# Patient Record
Sex: FEMALE | Race: WHITE | NOT HISPANIC OR LATINO | Employment: OTHER | URBAN - METROPOLITAN AREA
[De-identification: names, ages, dates, MRNs, and addresses within clinical notes are randomized per-mention and may not be internally consistent; named-entity substitution may affect disease eponyms.]

---

## 2019-11-05 ENCOUNTER — HOSPITAL ENCOUNTER (INPATIENT)
Facility: HOSPITAL | Age: 44
LOS: 1 days | Discharge: HOME | End: 2019-11-06
Attending: INTERNAL MEDICINE | Admitting: INTERNAL MEDICINE

## 2019-11-05 ENCOUNTER — APPOINTMENT (OUTPATIENT)
Dept: CARDIOLOGY | Facility: HOSPITAL | Age: 44
End: 2019-11-05

## 2019-11-05 DIAGNOSIS — I21.11 STEMI INVOLVING RIGHT CORONARY ARTERY (HCC): Primary | ICD-10-CM

## 2019-11-05 PROBLEM — I10 ESSENTIAL HYPERTENSION: Status: ACTIVE | Noted: 2019-11-05

## 2019-11-05 PROBLEM — E78.5 HYPERLIPIDEMIA: Status: ACTIVE | Noted: 2019-11-05

## 2019-11-05 PROBLEM — Z91.148 NONCOMPLIANCE WITH MEDICATIONS: Status: ACTIVE | Noted: 2019-11-05

## 2019-11-05 PROBLEM — Z72.0 TOBACCO ABUSE: Status: ACTIVE | Noted: 2019-11-05

## 2019-11-05 PROBLEM — E11.65 POORLY CONTROLLED TYPE 2 DIABETES MELLITUS (HCC): Status: ACTIVE | Noted: 2019-11-05

## 2019-11-05 PROBLEM — Z91.14 NONCOMPLIANCE WITH MEDICATIONS: Status: ACTIVE | Noted: 2019-11-05

## 2019-11-05 LAB
ACT BLD: 169 SECONDS (ref 82–152)
ACT BLD: 285 SECONDS (ref 82–152)
ACT BLD: 368 SECONDS (ref 82–152)
ALBUMIN UR-MCNC: 2.1 MG/DL
ANION GAP SERPL CALCULATED.3IONS-SCNC: 12.9 MMOL/L (ref 5–15)
AORTIC DIMENSIONLESS INDEX: 0.7 (DI)
BH CV ECHO MEAS - ACS: 1.9 CM
BH CV ECHO MEAS - AO MAX PG: 10 MMHG
BH CV ECHO MEAS - AO MEAN PG (FULL): 2 MMHG
BH CV ECHO MEAS - AO MEAN PG: 5 MMHG
BH CV ECHO MEAS - AO ROOT AREA (BSA CORRECTED): 1.5
BH CV ECHO MEAS - AO ROOT AREA: 6.6 CM^2
BH CV ECHO MEAS - AO ROOT DIAM: 2.9 CM
BH CV ECHO MEAS - AO V2 MAX: 157 CM/SEC
BH CV ECHO MEAS - AO V2 MEAN: 94.2 CM/SEC
BH CV ECHO MEAS - AO V2 VTI: 31.1 CM
BH CV ECHO MEAS - AVA(I,A): 2.3 CM^2
BH CV ECHO MEAS - AVA(I,D): 2.3 CM^2
BH CV ECHO MEAS - BSA(HAYCOCK): 2 M^2
BH CV ECHO MEAS - BSA: 1.9 M^2
BH CV ECHO MEAS - BZI_BMI: 33.7 KILOGRAMS/M^2
BH CV ECHO MEAS - BZI_METRIC_HEIGHT: 160 CM
BH CV ECHO MEAS - BZI_METRIC_WEIGHT: 86.2 KG
BH CV ECHO MEAS - EDV(MOD-SP2): 66 ML
BH CV ECHO MEAS - EDV(MOD-SP4): 69 ML
BH CV ECHO MEAS - EDV(TEICH): 177.9 ML
BH CV ECHO MEAS - EF(CUBED): 69.2 %
BH CV ECHO MEAS - EF(MOD-BP): 62 %
BH CV ECHO MEAS - EF(MOD-SP2): 66.7 %
BH CV ECHO MEAS - EF(MOD-SP4): 58 %
BH CV ECHO MEAS - EF(TEICH): 60 %
BH CV ECHO MEAS - ESV(MOD-SP2): 22 ML
BH CV ECHO MEAS - ESV(MOD-SP4): 29 ML
BH CV ECHO MEAS - ESV(TEICH): 71.3 ML
BH CV ECHO MEAS - FS: 32.5 %
BH CV ECHO MEAS - IVS/LVPW: 0.96
BH CV ECHO MEAS - IVSD: 1.1 CM
BH CV ECHO MEAS - LAT PEAK E' VEL: 9 CM/SEC
BH CV ECHO MEAS - LV DIASTOLIC VOL/BSA (35-75): 36.5 ML/M^2
BH CV ECHO MEAS - LV MASS(C)D: 267.4 GRAMS
BH CV ECHO MEAS - LV MASS(C)DI: 141.3 GRAMS/M^2
BH CV ECHO MEAS - LV MEAN PG: 3 MMHG
BH CV ECHO MEAS - LV SYSTOLIC VOL/BSA (12-30): 15.3 ML/M^2
BH CV ECHO MEAS - LV V1 MAX: 111 CM/SEC
BH CV ECHO MEAS - LV V1 MEAN: 73.5 CM/SEC
BH CV ECHO MEAS - LV V1 VTI: 22.9 CM
BH CV ECHO MEAS - LVIDD: 6 CM
BH CV ECHO MEAS - LVIDS: 4 CM
BH CV ECHO MEAS - LVLD AP2: 7.2 CM
BH CV ECHO MEAS - LVLD AP4: 6.8 CM
BH CV ECHO MEAS - LVLS AP2: 5.6 CM
BH CV ECHO MEAS - LVLS AP4: 6 CM
BH CV ECHO MEAS - LVOT AREA (M): 3.1 CM^2
BH CV ECHO MEAS - LVOT AREA: 3.1 CM^2
BH CV ECHO MEAS - LVOT DIAM: 2 CM
BH CV ECHO MEAS - LVPWD: 1.1 CM
BH CV ECHO MEAS - MED PEAK E' VEL: 6 CM/SEC
BH CV ECHO MEAS - MR MAX PG: 30.5 MMHG
BH CV ECHO MEAS - MR MAX VEL: 276 CM/SEC
BH CV ECHO MEAS - MV A DUR: 0.15 SEC
BH CV ECHO MEAS - MV A MAX VEL: 76.8 CM/SEC
BH CV ECHO MEAS - MV DEC SLOPE: 319.5 CM/SEC^2
BH CV ECHO MEAS - MV DEC TIME: 0.15 SEC
BH CV ECHO MEAS - MV E MAX VEL: 82.8 CM/SEC
BH CV ECHO MEAS - MV E/A: 1.1
BH CV ECHO MEAS - MV MEAN PG: 1 MMHG
BH CV ECHO MEAS - MV P1/2T MAX VEL: 94.3 CM/SEC
BH CV ECHO MEAS - MV P1/2T: 86.4 MSEC
BH CV ECHO MEAS - MV V2 MEAN: 53.1 CM/SEC
BH CV ECHO MEAS - MV V2 VTI: 38.5 CM
BH CV ECHO MEAS - MVA P1/2T LCG: 2.3 CM^2
BH CV ECHO MEAS - MVA(P1/2T): 2.5 CM^2
BH CV ECHO MEAS - MVA(VTI): 1.9 CM^2
BH CV ECHO MEAS - PA MAX PG: 4.8 MMHG
BH CV ECHO MEAS - PA V2 MAX: 109 CM/SEC
BH CV ECHO MEAS - PULM A REVS DUR: 0.11 SEC
BH CV ECHO MEAS - PULM A REVS VEL: 21.7 CM/SEC
BH CV ECHO MEAS - PULM DIAS VEL: 35.9 CM/SEC
BH CV ECHO MEAS - PULM S/D: 1.3
BH CV ECHO MEAS - PULM SYS VEL: 45.9 CM/SEC
BH CV ECHO MEAS - QP/QS: 0.86
BH CV ECHO MEAS - RAP SYSTOLE: 3 MMHG
BH CV ECHO MEAS - RV MEAN PG: 1 MMHG
BH CV ECHO MEAS - RV V1 MEAN: 50.5 CM/SEC
BH CV ECHO MEAS - RV V1 VTI: 17.9 CM
BH CV ECHO MEAS - RVOT AREA: 3.5 CM^2
BH CV ECHO MEAS - RVOT DIAM: 2.1 CM
BH CV ECHO MEAS - RVSP: 24 MMHG
BH CV ECHO MEAS - SI(AO): 108.6 ML/M^2
BH CV ECHO MEAS - SI(CUBED): 77.9 ML/M^2
BH CV ECHO MEAS - SI(LVOT): 38 ML/M^2
BH CV ECHO MEAS - SI(MOD-SP2): 23.3 ML/M^2
BH CV ECHO MEAS - SI(MOD-SP4): 21.1 ML/M^2
BH CV ECHO MEAS - SI(TEICH): 56.4 ML/M^2
BH CV ECHO MEAS - SV(AO): 205.4 ML
BH CV ECHO MEAS - SV(CUBED): 147.3 ML
BH CV ECHO MEAS - SV(LVOT): 71.9 ML
BH CV ECHO MEAS - SV(MOD-SP2): 44 ML
BH CV ECHO MEAS - SV(MOD-SP4): 40 ML
BH CV ECHO MEAS - SV(RVOT): 62 ML
BH CV ECHO MEAS - SV(TEICH): 106.7 ML
BH CV ECHO MEAS - TAPSE (>1.6): 2.1 CM2
BH CV ECHO MEAS - TR MAX VEL: 231 CM/SEC
BH CV ECHO MEASUREMENTS AVERAGE E/E' RATIO: 11.04
BH CV XLRA - TDI S': 13 CM/SEC
BUN BLD-MCNC: 13 MG/DL (ref 6–20)
BUN/CREAT SERPL: 21 (ref 7–25)
CALCIUM SPEC-SCNC: 9.4 MG/DL (ref 8.6–10.5)
CHLORIDE SERPL-SCNC: 96 MMOL/L (ref 98–107)
CHOLEST SERPL-MCNC: 195 MG/DL (ref 0–200)
CO2 SERPL-SCNC: 25.1 MMOL/L (ref 22–29)
CREAT BLD-MCNC: 0.62 MG/DL (ref 0.57–1)
CREAT UR-MCNC: 70.2 MG/DL
DEPRECATED RDW RBC AUTO: 41.6 FL (ref 37–54)
ERYTHROCYTE [DISTWIDTH] IN BLOOD BY AUTOMATED COUNT: 12.8 % (ref 12.3–15.4)
GFR SERPL CREATININE-BSD FRML MDRD: 104 ML/MIN/1.73
GLUCOSE BLD-MCNC: 197 MG/DL (ref 65–99)
GLUCOSE BLDC GLUCOMTR-MCNC: 161 MG/DL (ref 70–130)
GLUCOSE BLDC GLUCOMTR-MCNC: 164 MG/DL (ref 70–130)
GLUCOSE BLDC GLUCOMTR-MCNC: 166 MG/DL (ref 70–130)
GLUCOSE BLDC GLUCOMTR-MCNC: 185 MG/DL (ref 70–130)
GLUCOSE BLDC GLUCOMTR-MCNC: 206 MG/DL (ref 70–130)
HBA1C MFR BLD: 9.74 % (ref 4.8–5.6)
HCT VFR BLD AUTO: 39.9 % (ref 34–46.6)
HDLC SERPL-MCNC: 42 MG/DL (ref 40–60)
HGB BLD-MCNC: 13.2 G/DL (ref 12–15.9)
LDLC SERPL CALC-MCNC: 130 MG/DL (ref 0–100)
LDLC/HDLC SERPL: 3.1 {RATIO}
LEFT ATRIUM VOLUME INDEX: 28 ML/M2
LV EF 2D ECHO EST: 62 %
MAXIMAL PREDICTED HEART RATE: 174 BPM
MCH RBC QN AUTO: 29.3 PG (ref 26.6–33)
MCHC RBC AUTO-ENTMCNC: 33.1 G/DL (ref 31.5–35.7)
MCV RBC AUTO: 88.5 FL (ref 79–97)
MICROALBUMIN/CREAT UR: 29.9 MG/G
PLATELET # BLD AUTO: 292 10*3/MM3 (ref 140–450)
PMV BLD AUTO: 10.6 FL (ref 6–12)
POTASSIUM BLD-SCNC: 4 MMOL/L (ref 3.5–5.2)
RBC # BLD AUTO: 4.51 10*6/MM3 (ref 3.77–5.28)
SODIUM BLD-SCNC: 134 MMOL/L (ref 136–145)
STRESS TARGET HR: 148 BPM
T4 FREE SERPL-MCNC: 1.45 NG/DL (ref 0.93–1.7)
TRIGL SERPL-MCNC: 115 MG/DL (ref 0–150)
TSH SERPL DL<=0.05 MIU/L-ACNC: 1.24 UIU/ML (ref 0.27–4.2)
VLDLC SERPL-MCNC: 23 MG/DL (ref 5–40)
WBC NRBC COR # BLD: 16.01 10*3/MM3 (ref 3.4–10.8)

## 2019-11-05 PROCEDURE — 25010000002 FENTANYL CITRATE (PF) 100 MCG/2ML SOLUTION: Performed by: INTERNAL MEDICINE

## 2019-11-05 PROCEDURE — C9606 PERC D-E COR REVASC W AMI S: HCPCS | Performed by: INTERNAL MEDICINE

## 2019-11-05 PROCEDURE — 80061 LIPID PANEL: CPT | Performed by: INTERNAL MEDICINE

## 2019-11-05 PROCEDURE — 99223 1ST HOSP IP/OBS HIGH 75: CPT | Performed by: INTERNAL MEDICINE

## 2019-11-05 PROCEDURE — 93458 L HRT ARTERY/VENTRICLE ANGIO: CPT | Performed by: INTERNAL MEDICINE

## 2019-11-05 PROCEDURE — C1874 STENT, COATED/COV W/DEL SYS: HCPCS | Performed by: INTERNAL MEDICINE

## 2019-11-05 PROCEDURE — 82570 ASSAY OF URINE CREATININE: CPT | Performed by: INTERNAL MEDICINE

## 2019-11-05 PROCEDURE — 83036 HEMOGLOBIN GLYCOSYLATED A1C: CPT | Performed by: INTERNAL MEDICINE

## 2019-11-05 PROCEDURE — C9600 PERC DRUG-EL COR STENT SING: HCPCS | Performed by: INTERNAL MEDICINE

## 2019-11-05 PROCEDURE — 93306 TTE W/DOPPLER COMPLETE: CPT | Performed by: INTERNAL MEDICINE

## 2019-11-05 PROCEDURE — 4A023N7 MEASUREMENT OF CARDIAC SAMPLING AND PRESSURE, LEFT HEART, PERCUTANEOUS APPROACH: ICD-10-PCS | Performed by: INTERNAL MEDICINE

## 2019-11-05 PROCEDURE — 82043 UR ALBUMIN QUANTITATIVE: CPT | Performed by: INTERNAL MEDICINE

## 2019-11-05 PROCEDURE — 82962 GLUCOSE BLOOD TEST: CPT

## 2019-11-05 PROCEDURE — C1894 INTRO/SHEATH, NON-LASER: HCPCS | Performed by: INTERNAL MEDICINE

## 2019-11-05 PROCEDURE — 92941 PRQ TRLML REVSC TOT OCCL AMI: CPT | Performed by: INTERNAL MEDICINE

## 2019-11-05 PROCEDURE — 93005 ELECTROCARDIOGRAM TRACING: CPT | Performed by: INTERNAL MEDICINE

## 2019-11-05 PROCEDURE — 85347 COAGULATION TIME ACTIVATED: CPT

## 2019-11-05 PROCEDURE — C1887 CATHETER, GUIDING: HCPCS | Performed by: INTERNAL MEDICINE

## 2019-11-05 PROCEDURE — 63710000001 INSULIN LISPRO (HUMAN) PER 5 UNITS: Performed by: INTERNAL MEDICINE

## 2019-11-05 PROCEDURE — C1725 CATH, TRANSLUMIN NON-LASER: HCPCS | Performed by: INTERNAL MEDICINE

## 2019-11-05 PROCEDURE — 85027 COMPLETE CBC AUTOMATED: CPT | Performed by: INTERNAL MEDICINE

## 2019-11-05 PROCEDURE — 99153 MOD SED SAME PHYS/QHP EA: CPT | Performed by: INTERNAL MEDICINE

## 2019-11-05 PROCEDURE — 99152 MOD SED SAME PHYS/QHP 5/>YRS: CPT | Performed by: INTERNAL MEDICINE

## 2019-11-05 PROCEDURE — 027035Z DILATION OF CORONARY ARTERY, ONE ARTERY WITH TWO DRUG-ELUTING INTRALUMINAL DEVICES, PERCUTANEOUS APPROACH: ICD-10-PCS | Performed by: INTERNAL MEDICINE

## 2019-11-05 PROCEDURE — B2151ZZ FLUOROSCOPY OF LEFT HEART USING LOW OSMOLAR CONTRAST: ICD-10-PCS | Performed by: INTERNAL MEDICINE

## 2019-11-05 PROCEDURE — C1769 GUIDE WIRE: HCPCS | Performed by: INTERNAL MEDICINE

## 2019-11-05 PROCEDURE — 84443 ASSAY THYROID STIM HORMONE: CPT | Performed by: INTERNAL MEDICINE

## 2019-11-05 PROCEDURE — 25010000002 HEPARIN (PORCINE) PER 1000 UNITS: Performed by: INTERNAL MEDICINE

## 2019-11-05 PROCEDURE — 0 IOPAMIDOL PER 1 ML: Performed by: INTERNAL MEDICINE

## 2019-11-05 PROCEDURE — 99254 IP/OBS CNSLTJ NEW/EST MOD 60: CPT | Performed by: INTERNAL MEDICINE

## 2019-11-05 PROCEDURE — 25010000002 MIDAZOLAM PER 1 MG: Performed by: INTERNAL MEDICINE

## 2019-11-05 PROCEDURE — 84439 ASSAY OF FREE THYROXINE: CPT | Performed by: INTERNAL MEDICINE

## 2019-11-05 PROCEDURE — 93010 ELECTROCARDIOGRAM REPORT: CPT | Performed by: INTERNAL MEDICINE

## 2019-11-05 PROCEDURE — 92928 PRQ TCAT PLMT NTRAC ST 1 LES: CPT | Performed by: INTERNAL MEDICINE

## 2019-11-05 PROCEDURE — 80048 BASIC METABOLIC PNL TOTAL CA: CPT | Performed by: INTERNAL MEDICINE

## 2019-11-05 PROCEDURE — 93306 TTE W/DOPPLER COMPLETE: CPT

## 2019-11-05 PROCEDURE — B2111ZZ FLUOROSCOPY OF MULTIPLE CORONARY ARTERIES USING LOW OSMOLAR CONTRAST: ICD-10-PCS | Performed by: INTERNAL MEDICINE

## 2019-11-05 DEVICE — XIENCE SIERRA™ EVEROLIMUS ELUTING CORONARY STENT SYSTEM 2.50 MM X 18 MM / RAPID-EXCHANGE
Type: IMPLANTABLE DEVICE | Status: FUNCTIONAL
Brand: XIENCE SIERRA™

## 2019-11-05 DEVICE — XIENCE SIERRA™ EVEROLIMUS ELUTING CORONARY STENT SYSTEM 3.50 MM X 18 MM / RAPID-EXCHANGE
Type: IMPLANTABLE DEVICE | Status: FUNCTIONAL
Brand: XIENCE SIERRA™

## 2019-11-05 RX ORDER — ACETAMINOPHEN 325 MG/1
650 TABLET ORAL EVERY 4 HOURS PRN
Status: DISCONTINUED | OUTPATIENT
Start: 2019-11-05 | End: 2019-11-06 | Stop reason: HOSPADM

## 2019-11-05 RX ORDER — SODIUM CHLORIDE 9 MG/ML
INJECTION, SOLUTION INTRAVENOUS CONTINUOUS PRN
Status: COMPLETED | OUTPATIENT
Start: 2019-11-05 | End: 2019-11-05

## 2019-11-05 RX ORDER — FENTANYL CITRATE 50 UG/ML
INJECTION, SOLUTION INTRAMUSCULAR; INTRAVENOUS AS NEEDED
Status: DISCONTINUED | OUTPATIENT
Start: 2019-11-05 | End: 2019-11-05 | Stop reason: HOSPADM

## 2019-11-05 RX ORDER — ATORVASTATIN CALCIUM 20 MG/1
40 TABLET, FILM COATED ORAL NIGHTLY
Status: DISCONTINUED | OUTPATIENT
Start: 2019-11-05 | End: 2019-11-06 | Stop reason: HOSPADM

## 2019-11-05 RX ORDER — BUPROPION HYDROCHLORIDE 75 MG/1
150 TABLET ORAL 2 TIMES DAILY
COMMUNITY

## 2019-11-05 RX ORDER — ONDANSETRON 4 MG/1
4 TABLET, FILM COATED ORAL EVERY 6 HOURS PRN
Status: DISCONTINUED | OUTPATIENT
Start: 2019-11-05 | End: 2019-11-06 | Stop reason: HOSPADM

## 2019-11-05 RX ORDER — MIDAZOLAM HYDROCHLORIDE 1 MG/ML
INJECTION INTRAMUSCULAR; INTRAVENOUS AS NEEDED
Status: DISCONTINUED | OUTPATIENT
Start: 2019-11-05 | End: 2019-11-05 | Stop reason: HOSPADM

## 2019-11-05 RX ORDER — LOSARTAN POTASSIUM 25 MG/1
25 TABLET ORAL DAILY
Status: DISCONTINUED | OUTPATIENT
Start: 2019-11-05 | End: 2019-11-05

## 2019-11-05 RX ORDER — ASPIRIN 81 MG/1
81 TABLET ORAL DAILY
Status: DISCONTINUED | OUTPATIENT
Start: 2019-11-05 | End: 2019-11-06 | Stop reason: HOSPADM

## 2019-11-05 RX ORDER — LIDOCAINE HYDROCHLORIDE 20 MG/ML
INJECTION, SOLUTION INFILTRATION; PERINEURAL AS NEEDED
Status: DISCONTINUED | OUTPATIENT
Start: 2019-11-05 | End: 2019-11-05 | Stop reason: HOSPADM

## 2019-11-05 RX ORDER — NICOTINE POLACRILEX 4 MG
15 LOZENGE BUCCAL
Status: DISCONTINUED | OUTPATIENT
Start: 2019-11-05 | End: 2019-11-06 | Stop reason: HOSPADM

## 2019-11-05 RX ORDER — NICOTINE 21 MG/24HR
1 PATCH, TRANSDERMAL 24 HOURS TRANSDERMAL EVERY 24 HOURS
Status: DISCONTINUED | OUTPATIENT
Start: 2019-11-05 | End: 2019-11-06 | Stop reason: HOSPADM

## 2019-11-05 RX ORDER — SODIUM CHLORIDE 9 MG/ML
125 INJECTION, SOLUTION INTRAVENOUS CONTINUOUS
Status: ACTIVE | OUTPATIENT
Start: 2019-11-05 | End: 2019-11-05

## 2019-11-05 RX ORDER — PRASUGREL 10 MG/1
TABLET, FILM COATED ORAL AS NEEDED
Status: DISCONTINUED | OUTPATIENT
Start: 2019-11-05 | End: 2019-11-05 | Stop reason: HOSPADM

## 2019-11-05 RX ORDER — SIMVASTATIN 40 MG
40 TABLET ORAL NIGHTLY
COMMUNITY
End: 2019-11-06 | Stop reason: HOSPADM

## 2019-11-05 RX ORDER — FENOFIBRATE 160 MG/1
160 TABLET ORAL DAILY
COMMUNITY
End: 2019-11-06 | Stop reason: HOSPADM

## 2019-11-05 RX ORDER — GLIPIZIDE 10 MG/1
10 TABLET ORAL
Status: DISCONTINUED | OUTPATIENT
Start: 2019-11-05 | End: 2019-11-06 | Stop reason: HOSPADM

## 2019-11-05 RX ORDER — GLIPIZIDE 10 MG/1
10 TABLET ORAL
COMMUNITY
End: 2023-01-25 | Stop reason: ALTCHOICE

## 2019-11-05 RX ORDER — PRASUGREL 10 MG/1
10 TABLET, FILM COATED ORAL DAILY
Status: DISCONTINUED | OUTPATIENT
Start: 2019-11-06 | End: 2019-11-06 | Stop reason: HOSPADM

## 2019-11-05 RX ORDER — ZOLPIDEM TARTRATE 5 MG/1
5 TABLET ORAL NIGHTLY PRN
Status: DISCONTINUED | OUTPATIENT
Start: 2019-11-05 | End: 2019-11-06 | Stop reason: HOSPADM

## 2019-11-05 RX ORDER — HYDROCODONE BITARTRATE AND ACETAMINOPHEN 5; 325 MG/1; MG/1
1 TABLET ORAL EVERY 4 HOURS PRN
Status: DISCONTINUED | OUTPATIENT
Start: 2019-11-05 | End: 2019-11-06 | Stop reason: HOSPADM

## 2019-11-05 RX ORDER — DEXTROSE MONOHYDRATE 25 G/50ML
25 INJECTION, SOLUTION INTRAVENOUS
Status: DISCONTINUED | OUTPATIENT
Start: 2019-11-05 | End: 2019-11-06 | Stop reason: HOSPADM

## 2019-11-05 RX ORDER — HEPARIN SODIUM 1000 [USP'U]/ML
INJECTION, SOLUTION INTRAVENOUS; SUBCUTANEOUS AS NEEDED
Status: DISCONTINUED | OUTPATIENT
Start: 2019-11-05 | End: 2019-11-05 | Stop reason: HOSPADM

## 2019-11-05 RX ORDER — ONDANSETRON 2 MG/ML
4 INJECTION INTRAMUSCULAR; INTRAVENOUS EVERY 6 HOURS PRN
Status: DISCONTINUED | OUTPATIENT
Start: 2019-11-05 | End: 2019-11-06 | Stop reason: HOSPADM

## 2019-11-05 RX ORDER — LISINOPRIL 10 MG/1
12.5 TABLET ORAL DAILY
Status: ON HOLD | COMMUNITY
End: 2019-11-06

## 2019-11-05 RX ADMIN — SODIUM CHLORIDE 125 ML/HR: 9 INJECTION, SOLUTION INTRAVENOUS at 03:17

## 2019-11-05 RX ADMIN — ASPIRIN 81 MG: 81 TABLET, COATED ORAL at 08:24

## 2019-11-05 RX ADMIN — ATORVASTATIN CALCIUM 40 MG: 20 TABLET, FILM COATED ORAL at 20:49

## 2019-11-05 RX ADMIN — INSULIN LISPRO 4 UNITS: 100 INJECTION, SOLUTION INTRAVENOUS; SUBCUTANEOUS at 20:49

## 2019-11-05 RX ADMIN — SODIUM CHLORIDE 125 ML/HR: 9 INJECTION, SOLUTION INTRAVENOUS at 04:29

## 2019-11-05 RX ADMIN — NICOTINE 1 PATCH: 21 PATCH, EXTENDED RELEASE TRANSDERMAL at 08:24

## 2019-11-05 RX ADMIN — LOSARTAN POTASSIUM 25 MG: 25 TABLET, FILM COATED ORAL at 08:24

## 2019-11-05 RX ADMIN — INSULIN LISPRO 2 UNITS: 100 INJECTION, SOLUTION INTRAVENOUS; SUBCUTANEOUS at 17:37

## 2019-11-05 RX ADMIN — INSULIN LISPRO 2 UNITS: 100 INJECTION, SOLUTION INTRAVENOUS; SUBCUTANEOUS at 12:11

## 2019-11-05 NOTE — CONSULTS
"Met with patient, discussed benefits of cardiac rehab. Provided phase II information packet, which includes; general information about cardiac rehab, [facility] handout and [facility] letter article entitled “Cardiac Rehab is often the Best Medicine for Recovery\", stresses the importance of cardiac rehab after a heart event.   I provided the contact information for cardiac rehab  at [facility]. Reviewed risk factors for cardiovascular disease along with information about, smoking cessation and stents.  "

## 2019-11-05 NOTE — PROGRESS NOTES
"Nelda Mckeon  1975 44 y.o.  [id number]      Patient Care Team:  Noah Mcnamara DO as PCP - General (Internal Medicine)    CC: inf stemi s/p PCI to RCA and LAD, dm, htn, tob    Interval History: Doing well      Objective   Vital Signs  Temp:  [97.8 °F (36.6 °C)-98.3 °F (36.8 °C)] 97.8 °F (36.6 °C)  Heart Rate:  [54-80] 57  Resp:  [18-24] 18  BP: (101-139)/(64-83) 115/75    Intake/Output Summary (Last 24 hours) at 11/5/2019 0911  Last data filed at 11/5/2019 0829  Gross per 24 hour   Intake 1130 ml   Output 800 ml   Net 330 ml     Flowsheet Rows      First Filed Value   Admission Height  160 cm (63\") Documented at 11/05/2019 0142   Admission Weight  88.9 kg (196 lb) Documented at 11/05/2019 0142          Physical Exam:   General Appearance:    Alert,oriented, in no acute distress   Lungs:     Clear to auscultation,BS are equal    Heart:    Normal S1 and S2, RRR without murmur, gallop or rub   HEENT:    Sclerae are clear, no JVD or adenopathy   Abdomen:     Normal bowel sounds, soft non-tender, non-distended, no HSM   Extremities:   Moves all extremities well, no edema, no cyanosis, no             Redness, no rash     Medication Review:        aspirin 81 mg Oral Daily   atorvastatin 40 mg Oral Nightly   losartan 25 mg Oral Daily   nicotine 1 patch Transdermal Q24H   [START ON 11/6/2019] prasugrel 10 mg Oral Daily       sodium chloride 125 mL/hr Last Rate: 125 mL/hr (11/05/19 0429)         I reviewed the patient's new clinical results.  I personally viewed and interpreted the patient's EKG/Telemetry data    Assessment/Plan  Active Hospital Problems    Diagnosis  POA   • STEMI involving right coronary artery (CMS/HCC) [I21.11]  Unknown      Resolved Hospital Problems   No resolved problems to display.       Transfer to [place]; inc activity; aggressive risk mod; endo consult; [facility] care; look to dc tomorrow; SGLT2 inhibitor?    Alejandro Espion MD  11/05/19  9:11 AM            "

## 2019-11-05 NOTE — CONSULTS
ENDOCRINOLOGY CONSULTATION    CONSULTING PHYSICIAN:  Paco Charles MD    REQUESTING PHYSICIAN:  Alejandro Espino MD    REASON FOR CONSULTATION:  Treatment of diabetes mellitus.    HISTORY OF PRESENT ILLNESS:  The patient is a 44-year-old female who was transferred from [facility] where she was initially seen for an acute inferior wall myocardial infarction.  She underwent emergent cardiac catheterization and angioplasty with stent to the left anterior descending and right coronary artery.  Immediate postop course has been uneventful.  Hemoglobin A1c on admission is markedly elevated at 9.74%.    She has known diabetes mellitus since 2017.  She has been on glipizide 10 mg b.i.d., and metformin 1000 mg b.i.d., and was in good control until several months ago she stopped taking all her medications regularly due to multiple life stressors.  Her hemoglobin A1c in the past has been at 5%.      Her last eye examination was 4 years ago.  She denies any retinopathy.  She denies blurry vision.  She denies any associated nephropathy.  She denies numbness, tingling or burning in her hands or feet.    She has hyperlipidemia and is supposed to be on Zocor 40 mg per day and fenofibrate 160 mg per day.  She has not been taking these medications regularly.    She has hypertension and is supposed to be on lisinopril/HCT 10/12.5 mg two tablets once a day, but she has not been taking these medications regularly.    PAST MEDICAL HISTORY:  1.  History of anxiety and has been on Wellbutrin.  2.  She has had a previous cholecystectomy.    ALLERGIES:  No known drug allergies.    MENSTRUAL/OBSTETRICAL HISTORY:  She is  1, para 1.  She has regular menstrual periods.  She is not on birth control pills.  Her partner had a previous vasectomy.    SOCIAL HISTORY:  The patient is single.  She smokes 1 pack of cigarettes per day for 50 years.  She denies tobacco related illness.  She denies alcohol or drug abuse.    FAMILY  HISTORY:  No history of diabetes mellitus.  Her father  of a heart attack at age 48.  Her paternal grandmother  of heart attack at age 54.  Her paternal grandfather  of heart attack at age 52.    REVIEW OF SYSTEMS:  She has lost 25 pounds over the past 3 months.  She denies any nausea or vomiting.  She has intermittent diarrhea, which she attributes to the metformin that she has been taking.  She denies fevers, chills, cough or cold symptoms.  She denies wheezing or shortness of breath.  She is not longer having any chest pain.  She denies fever or chills.  She denies dysuria, polyuria or polydipsia.  She denies melena, hematochezia, hematemesis, hematuria.  She denies seizures or loss of consciousness.    PHYSICAL EXAMINATION:  VITAL SIGNS:  Blood pressure is 118/76, respiratory rate 61, heart rate 98.1 degrees Fahrenheit.  GENERAL:  The patient is awake, alert, oriented.  Weight is 86.2 kg, BMI 34.  No dyspneic.  Not orthopneic.  Not in acute distress.  HEENT:  Pink conjunctivae.  Sclerae is anicteric.  No xanthelasma.  Full extraocular muscle movement.  No facial asymmetry.  Speech is fluent.  NECK:  No carotid bruit.  The thyroid is not enlarged.  No cervical lymphadenopathy.  CHEST:  Equal chest expansion.  No rales or wheezes.  HEART:  Regular heart rate and rhythm.  No gallop or murmur  ABDOMEN:  Soft, globular, nontender.  Bowel sounds are active.  No palpable masses.  EXTREMITIES:  Warm.  No cyanosis, pedal edema or clubbing.  Light touch sensation is grossly intact.    IMPRESSION:  1.  Acute inferior wall myocardial infarction status post angioplasty with stent to the LAD and RCA.  2.  Poorly controlled type 2 diabetes mellitus.  3.  Hyperlipidemia.  4.  Hypertension.  5.  Tobacco abuse.  6.  Medication noncompliance.    RECOMMENDATIONS:  Restart glipizide 10 mg b.i.d.  Hold metformin for 48 hours and switch to metformin ER 1000 mg b.i.d.  Consider switching from glipizide to Jardiance as an  outpatient for diabetes control, weight loss and secondary prevention of heart disease.  Will defer blood pressure control to cardiologist.  Continue Lipitor 40 mg once a day.  Recheck lipid profile in 4-6 weeks.  Check thyroid function test and urine microalbumin.    Thank you for the consultation.  I will follow the patient with you during her hospital stay.

## 2019-11-05 NOTE — PLAN OF CARE
Problem: Patient Care Overview  Goal: Plan of Care Review  Outcome: Ongoing (interventions implemented as appropriate)   11/05/19 0245 11/05/19 0704   Plan of Care Review   Progress --  improving   OTHER   Outcome Summary --  Pt transferred from [facility] for acute STEMI. Dr. Espino took pt to cath lab, stents placed in mid-RCA, proximal RCA, and mid LAD. Pt current smoker. No complaints of chest pain post cath. TR band removed wihtout complication, occlusive dressing applied. VSS. CTM   Coping/Psychosocial   Plan of Care Reviewed With patient --        Problem: Skin Injury Risk (Adult)  Goal: Identify Related Risk Factors and Signs and Symptoms  Outcome: Outcome(s) achieved Date Met: 11/05/19    Goal: Skin Health and Integrity  Outcome: Ongoing (interventions implemented as appropriate)      Problem: Fall Risk (Adult)  Goal: Identify Related Risk Factors and Signs and Symptoms  Outcome: Outcome(s) achieved Date Met: 11/05/19    Goal: Absence of Fall  Outcome: Ongoing (interventions implemented as appropriate)      Problem: Cardiac Catheterization (Diagnostic/Interventional) (Adult)  Goal: Signs and Symptoms of Listed Potential Problems Will be Absent, Minimized or Managed (Cardiac Catheterization)  Outcome: Ongoing (interventions implemented as appropriate)    Goal: Anesthesia/Sedation Recovery  Outcome: Ongoing (interventions implemented as appropriate)

## 2019-11-05 NOTE — PROGRESS NOTES
Discharge Planning Assessment  [facility]     Patient Name: Nelda Mckeon  MRN: [id number]  Today's Date: 11/5/2019    Admit Date: 11/5/2019    Discharge Needs Assessment     Row Name 11/05/19 1602       Living Environment    Lives With  significant other    Current Living Arrangements  home/apartment/condo    Potentially Unsafe Housing Conditions  unable to assess    Primary Care Provided by  self;spouse/significant other    Provides Primary Care For  no one    Family Caregiver if Needed  significant other    Quality of Family Relationships  unable to assess    Able to Return to Prior Arrangements  yes       Resource/Environmental Concerns    Resource/Environmental Concerns  none    Transportation Concerns  car, none       Transition Planning    Patient/Family Anticipates Transition to  home with family    Patient/Family Anticipated Services at Transition  none       Discharge Needs Assessment    Concerns to be Addressed  discharge planning    Equipment Currently Used at Home  none    Anticipated Changes Related to Illness  none        Discharge Plan     Row Name 11/05/19 1602       Plan    Plan  Home  Will follow up for cardiac rehab in [facility]    Plan Comments   CCP spoke to pt at bedside  to discuss discharge planning.  CCP role explained.  Face sheet verified.  Pt primary care provider is Dr. Noah Mcnamara.  Pt emergency contact is her boyfriend Preston, .   Pt lives in mobile home with her S.O.  She is independent with ADL's. She uses no DME  to ambulate.  She has no  history of Home Health.  She has not been to a rehab in the past.  Pt [facility] in [place].  Plan is home    CCP following        Destination      No service coordination in this encounter.      Durable Medical Equipment      No service coordination in this encounter.      Dialysis/Infusion      No service coordination in this encounter.      Home Medical Care      No service coordination in this  encounter.      Therapy      No service coordination in this encounter.      Community Resources      No service coordination in this encounter.          Demographic Summary    No documentation.       Functional Status    No documentation.       Psychosocial    No documentation.       Abuse/Neglect    No documentation.       Legal    No documentation.       Substance Abuse    No documentation.       Patient Forms    No documentation.           Xuan Ruiz RN

## 2019-11-05 NOTE — PLAN OF CARE
Problem: Patient Care Overview  Goal: Plan of Care Review  Outcome: Ongoing (interventions implemented as appropriate)   11/05/19 0180   Plan of Care Review   Progress improving   OTHER   Outcome Summary Pt remains in CCU, tele overflow awaiting transfer. Denies cp and soa, tolerating diet and up out of bed. Vitals stable.    Coping/Psychosocial   Plan of Care Reviewed With patient     Goal: Individualization and Mutuality  Outcome: Ongoing (interventions implemented as appropriate)    Goal: Discharge Needs Assessment  Outcome: Ongoing (interventions implemented as appropriate)    Goal: Interprofessional Rounds/Family Conf  Outcome: Ongoing (interventions implemented as appropriate)      Problem: Skin Injury Risk (Adult)  Goal: Skin Health and Integrity  Outcome: Ongoing (interventions implemented as appropriate)      Problem: Fall Risk (Adult)  Goal: Absence of Fall  Outcome: Ongoing (interventions implemented as appropriate)      Problem: Cardiac Catheterization (Diagnostic/Interventional) (Adult)  Goal: Signs and Symptoms of Listed Potential Problems Will be Absent, Minimized or Managed (Cardiac Catheterization)  Outcome: Ongoing (interventions implemented as appropriate)    Goal: Anesthesia/Sedation Recovery  Outcome: Ongoing (interventions implemented as appropriate)

## 2019-11-05 NOTE — H&P
Date of Hospital Visit: 19  Encounter Provider: Alejandro Espino MD  Place of Service: [facility]  Patient Name: Nelda Mckeon  :1975  [id number]    Chief complaint: Chest pain    History of Present Illness: This is a 44-year-old lady with a history of diabetes hypertension obesity ongoing tobacco abuse who started having substernal chest discomfort and shortness of breath about 14 hours prior to arrival this went off and on all day today and finally it would not remit this evening she went to [facility] ECG there showed an inferior STEMI but she looked pretty good at that point was not really complaining of a lot of pain initial troponin was 0.04 I recommended emergent helicopter transfer.  On arrival here she says she is not really having chest pain but again ECG looks abnormal.  She has no history of bleeding no history of lung or kidney problems no known allergies she is not on any drugs like cocaine or other illicit substances.  She really is otherwise been healthy  other than what is noted    No past medical history on file.    No past surgical history on file.    No medications prior to admission.       Current Meds  No current facility-administered medications on file prior to encounter.      No current outpatient medications on file prior to encounter.       Social History     Socioeconomic History   • Marital status: Single     Spouse name: Not on file   • Number of children: Not on file   • Years of education: Not on file   • Highest education level: Not on file       Family Hx: Non-contributory    REVIEW OF SYSTEMS:   ROS was performed and is negative except as outlined in HPI     REVIEW OF SYSTEMS:   CONSTITUTIONAL: No weight loss, fever, chills, weakness or fatigue.   HEENT: Eyes: No visual loss, blurred vision, double vision or yellow sclerae. Ears, Nose, Throat: No hearing loss, sneezing, congestion, runny nose or sore throat.  "  SKIN: No rash or itching.     RESPIRATORY: No shortness of breath, hemoptysis, cough or sputum.   GASTROINTESTINAL: No anorexia, nausea, vomiting or diarrhea. No abdominal pain, bright red blood per rectum or melena.  NEUROLOGICAL: No headache, dizziness, syncope, paralysis, numbness or tingling in the extremities.  MUSCULOSKELETAL: No muscle, back pain, joint pain or stiffness.   HEMATOLOGIC: No anemia, bleeding or bruising.   LYMPHATICS: No enlarged nodes.  PSYCHIATRIC: No history of depression, anxiety, hallucinations.   ENDOCRINOLOGIC: No reports of sweating, cold or heat intolerance. No polyuria or polydipsia.        Objective:     Vitals:    11/05/19 0200 11/05/19 0205 11/05/19 0208 11/05/19 0212   Resp: 20 20 22 20   Weight:       Height:         Body mass index is 34.72 kg/m².  Flowsheet Rows      First Filed Value   Admission Height  160 cm (63\") Documented at 11/05/2019 0142   Admission Weight  88.9 kg (196 lb) Documented at 11/05/2019 0142          General Appearance:    Alert, oriented x 3, in no acute distress   Head:    Normocephalic, without obvious abnormality, atraumatic   Ears:    Ears appear intact with no abnormalities noted   Throat:   No oral lesions, dentition good   Neck:   No adenopathy, supple, trachea midline, no thyromegaly, no carotid bruit, no JVD   Lungs:    Breath sounds are equal and  clear to auscultation    Heart:   Normal S1 and S2, RRR, no murmur/gallop or rub   Abdomen:    Normal bowel sounds, obese, soft non-tender, non-distended, no organomegaly, no guarding   Extremities:   Moves all extremities well, no edema, no cyanosis, no redness   Pulses:   Pulses palpable and equal bilaterally. Normal radial pulses   Skin:   No bleeding, bruising or rash   Lymph nodes:   No palpable adenopathy     I personally viewed and interpreted the patient's EKG/Telemetry data    Assessment:  Active Hospital Problems    Diagnosis  POA   • STEMI involving right coronary artery (CMS/HCC) [I21.11]  " Unknown      Resolved Hospital Problems   No resolved problems to display.       Plan: She is extremely high risk for coronary disease with a risk profile and as an abnormal ECG she is got to have a cath to look at her coronaries I think she probably is having a STEMI further decisions will be based on the findings at the time of cath but likely will end up doing a PCI

## 2019-11-06 ENCOUNTER — TELEPHONE (OUTPATIENT)
Dept: CARDIOLOGY | Facility: CLINIC | Age: 44
End: 2019-11-06

## 2019-11-06 VITALS
DIASTOLIC BLOOD PRESSURE: 70 MMHG | RESPIRATION RATE: 16 BRPM | TEMPERATURE: 97.8 F | SYSTOLIC BLOOD PRESSURE: 118 MMHG | HEIGHT: 63 IN | BODY MASS INDEX: 33.66 KG/M2 | HEART RATE: 62 BPM | WEIGHT: 190 LBS | OXYGEN SATURATION: 97 %

## 2019-11-06 LAB
ANION GAP SERPL CALCULATED.3IONS-SCNC: 12.3 MMOL/L (ref 5–15)
BUN BLD-MCNC: 11 MG/DL (ref 6–20)
BUN/CREAT SERPL: 17.5 (ref 7–25)
CALCIUM SPEC-SCNC: 8.9 MG/DL (ref 8.6–10.5)
CHLORIDE SERPL-SCNC: 103 MMOL/L (ref 98–107)
CO2 SERPL-SCNC: 23.7 MMOL/L (ref 22–29)
CREAT BLD-MCNC: 0.63 MG/DL (ref 0.57–1)
DEPRECATED RDW RBC AUTO: 42.4 FL (ref 37–54)
ERYTHROCYTE [DISTWIDTH] IN BLOOD BY AUTOMATED COUNT: 13.1 % (ref 12.3–15.4)
GFR SERPL CREATININE-BSD FRML MDRD: 102 ML/MIN/1.73
GLUCOSE BLD-MCNC: 129 MG/DL (ref 65–99)
GLUCOSE BLDC GLUCOMTR-MCNC: 151 MG/DL (ref 70–130)
HCT VFR BLD AUTO: 39.1 % (ref 34–46.6)
HGB BLD-MCNC: 13.1 G/DL (ref 12–15.9)
MCH RBC QN AUTO: 29.5 PG (ref 26.6–33)
MCHC RBC AUTO-ENTMCNC: 33.5 G/DL (ref 31.5–35.7)
MCV RBC AUTO: 88.1 FL (ref 79–97)
PLATELET # BLD AUTO: 237 10*3/MM3 (ref 140–450)
PMV BLD AUTO: 10.1 FL (ref 6–12)
POTASSIUM BLD-SCNC: 4.1 MMOL/L (ref 3.5–5.2)
RBC # BLD AUTO: 4.44 10*6/MM3 (ref 3.77–5.28)
SODIUM BLD-SCNC: 139 MMOL/L (ref 136–145)
TROPONIN T SERPL-MCNC: 0.76 NG/ML (ref 0–0.03)
WBC NRBC COR # BLD: 8.55 10*3/MM3 (ref 3.4–10.8)

## 2019-11-06 PROCEDURE — 93005 ELECTROCARDIOGRAM TRACING: CPT | Performed by: INTERNAL MEDICINE

## 2019-11-06 PROCEDURE — 85027 COMPLETE CBC AUTOMATED: CPT | Performed by: INTERNAL MEDICINE

## 2019-11-06 PROCEDURE — 93010 ELECTROCARDIOGRAM REPORT: CPT | Performed by: INTERNAL MEDICINE

## 2019-11-06 PROCEDURE — 99239 HOSP IP/OBS DSCHRG MGMT >30: CPT | Performed by: NURSE PRACTITIONER

## 2019-11-06 PROCEDURE — 99232 SBSQ HOSP IP/OBS MODERATE 35: CPT | Performed by: INTERNAL MEDICINE

## 2019-11-06 PROCEDURE — 84484 ASSAY OF TROPONIN QUANT: CPT | Performed by: INTERNAL MEDICINE

## 2019-11-06 PROCEDURE — 63710000001 INSULIN LISPRO (HUMAN) PER 5 UNITS: Performed by: INTERNAL MEDICINE

## 2019-11-06 PROCEDURE — 82962 GLUCOSE BLOOD TEST: CPT

## 2019-11-06 PROCEDURE — 80048 BASIC METABOLIC PNL TOTAL CA: CPT | Performed by: INTERNAL MEDICINE

## 2019-11-06 RX ORDER — LISINOPRIL AND HYDROCHLOROTHIAZIDE 12.5; 1 MG/1; MG/1
2 TABLET ORAL DAILY
COMMUNITY
End: 2019-11-06 | Stop reason: HOSPADM

## 2019-11-06 RX ORDER — BUPROPION HYDROCHLORIDE 75 MG/1
150 TABLET ORAL EVERY 12 HOURS SCHEDULED
Status: DISCONTINUED | OUTPATIENT
Start: 2019-11-06 | End: 2019-11-06 | Stop reason: HOSPADM

## 2019-11-06 RX ORDER — PRASUGREL 10 MG/1
10 TABLET, FILM COATED ORAL DAILY
Qty: 30 TABLET | Refills: 11 | Status: SHIPPED | OUTPATIENT
Start: 2019-11-06 | End: 2020-12-11

## 2019-11-06 RX ORDER — ATORVASTATIN CALCIUM 40 MG/1
40 TABLET, FILM COATED ORAL NIGHTLY
Qty: 30 TABLET | Refills: 11 | Status: CANCELLED | OUTPATIENT
Start: 2019-11-06

## 2019-11-06 RX ORDER — ASPIRIN 81 MG/1
81 TABLET ORAL DAILY
Qty: 30 TABLET | Refills: 11 | Status: CANCELLED | OUTPATIENT
Start: 2019-11-07

## 2019-11-06 RX ORDER — LISINOPRIL 10 MG/1
10 TABLET ORAL DAILY
Qty: 30 TABLET | Refills: 0 | Status: SHIPPED | OUTPATIENT
Start: 2019-11-06 | End: 2019-12-04 | Stop reason: SDUPTHER

## 2019-11-06 RX ORDER — PRASUGREL 10 MG/1
10 TABLET, FILM COATED ORAL DAILY
Qty: 30 TABLET | Refills: 11 | Status: CANCELLED | OUTPATIENT
Start: 2019-11-06

## 2019-11-06 RX ORDER — ASPIRIN 81 MG/1
81 TABLET ORAL DAILY
Qty: 30 TABLET | Refills: 11 | Status: SHIPPED | OUTPATIENT
Start: 2019-11-07 | End: 2019-12-10

## 2019-11-06 RX ORDER — ATORVASTATIN CALCIUM 40 MG/1
40 TABLET, FILM COATED ORAL NIGHTLY
Qty: 30 TABLET | Refills: 11 | Status: SHIPPED | OUTPATIENT
Start: 2019-11-06 | End: 2020-12-11 | Stop reason: SDUPTHER

## 2019-11-06 RX ADMIN — PRASUGREL 10 MG: 10 TABLET, FILM COATED ORAL at 10:22

## 2019-11-06 RX ADMIN — GLIPIZIDE 10 MG: 10 TABLET ORAL at 07:58

## 2019-11-06 RX ADMIN — LISINOPRIL 12.5 MG: 10 TABLET ORAL at 08:00

## 2019-11-06 RX ADMIN — NICOTINE 1 PATCH: 21 PATCH, EXTENDED RELEASE TRANSDERMAL at 08:01

## 2019-11-06 RX ADMIN — ASPIRIN 81 MG: 81 TABLET, COATED ORAL at 08:00

## 2019-11-06 RX ADMIN — INSULIN LISPRO 2 UNITS: 100 INJECTION, SOLUTION INTRAVENOUS; SUBCUTANEOUS at 08:06

## 2019-11-06 NOTE — DISCHARGE SUMMARY
Hospital Discharge    Patient Name: Nelda Mckeon  Age/Sex: 44 y.o. female  : 1975  MRN: [id number]    Encounter Provider: HOLGER Phillips  Referring Provider: Alejandro Espino MD  Place of Service: [facility] CARDIOLOGY  Patient Care Team:  Noah Mcnamara DO as PCP - General (Internal Medicine)         Date of Discharge:  2019   Date of Admit: 2019    Discharge Condition: Stable  Discharge Diagnosis:    STEMI involving right coronary artery (CMS/HCC)    Noncompliance with medications    Poorly controlled type 2 diabetes mellitus (CMS/[facility])    Hyperlipidemia    Essential hypertension    Tobacco abuse      Hospital Course:   Nelda Mckeon is a 44 y.o. female who presented to [facility] on 2019 with substernal chest discomfort and shortness of breath.  EKG showed an inferior STEMI.  Her initial troponin was 0.04 and she was emergently transferred to [facility] via helicopter.  She was taken urgently for cardiac catheterization which revealed 20% mid left main, 90% mid LAD, 40% proximal OM1, 90% proximal RCA, and 90% mid RCA.  She underwent successful angioplasty and drug-eluting stenting to the proximal RCA, mid RCA, and mid LAD.  She was started on DAPT with aspirin and Effient.  Postprocedure echocardiogram revealed normal LV function and size with an estimated EF of 62% and no significant valvular abnormalities.  Her blood pressure is too low for beta-blockade.  This should be reassessed at follow-up.  She was seen by endocrinology for diabetes management.  They recommended switching from glipizide to Jardiance outpatient. She will continue atorvastatin 40 mg and will need a repeat lipid panel and LFTs in 4-6 weeks.  Aggressive risk factor modification is crucial.  This morning she is resting in bed with no complaints of chest pain or shortness of breath.  Her troponin is elevated this morning but her EKG looks stable.  She is  appropriate for discharge and will follow-up with myself or Allison CRAWFORD in 1 week and Dr. Espino in 1 month.  We will get her enrolled in cardiac rehab at [facility].    Objective:  Temp:  [97.8 °F (36.6 °C)-98.1 °F (36.7 °C)] 97.8 °F (36.6 °C)  Heart Rate:  [54-76] 76  Resp:  [16] 16  BP: ()/(58-77) 104/77    Intake/Output Summary (Last 24 hours) at 11/6/2019 0921  Last data filed at 11/5/2019 2045  Gross per 24 hour   Intake 1420 ml   Output --   Net 1420 ml     Body mass index is 33.66 kg/m².      11/05/19  0142 11/05/19  0430 11/05/19  1035   Weight: 88.9 kg (196 lb) 86.5 kg (190 lb 11.2 oz) 86.2 kg (190 lb)     Weight change: -2.722 kg ()    Physical Exam:  Physical Exam   Constitutional: She is oriented to person, place, and time. She appears well-developed and well-nourished. No distress.   HENT:   Head: Normocephalic and atraumatic.   Eyes: Pupils are equal, round, and reactive to light.   Neck: No JVD present. No thyromegaly present.   Cardiovascular: Normal rate, regular rhythm, normal heart sounds and intact distal pulses.   No murmur heard.  Pulmonary/Chest: Effort normal and breath sounds normal. No respiratory distress.   Abdominal: Soft. Bowel sounds are normal. She exhibits no distension. There is no splenomegaly or hepatomegaly. There is no tenderness.   Musculoskeletal: Normal range of motion. She exhibits no edema.   Neurological: She is alert and oriented to person, place, and time.   Skin: Skin is warm and dry. She is not diaphoretic. No erythema.   Radial site well healed without erythema or edema. Proximal and distal pulses palpable. Good capillary refill.   Psychiatric: She has a normal mood and affect. Her behavior is normal. Judgment normal.       Procedures Performed  Procedure(s):  Left Heart Cath  Stent BILL coronary  Left ventriculography  Percutaneous Coronary Intervention        Cardiac Catheterization 11/5/2019  FINDINGS:     LEFT VENTRICULOGRAPHY: The LV pressure was  114/10.  There was normal LV systolic function very small focal area of hypokinesis in the mid inferior wall little bit of calcium noted in the coronaries.  There was no mitral insufficiency or gradient across the aortic valve on pullback.     CORONARY ANGIOGRAPHY:  Left main: 20% mid  Left anterior descending: Normal approximately 90% mid lesion normal distally diagonals are free of obstructive disease  Ramus intermedius:Not present  Circumflex: Normal proximally moderate-sized OM1 with 40% proximal disease otherwise the remainder of the vessels normal faint left to right collaterals to the PDA  RCA: Is a dominant vessel.  90% proximal 90% mid lesion DAMION-2 flow     Interventional Note:  8000 units of heparin was given and the ACT was therapeutic.  Effient was given in a loading dose of 60 mg.  A 6 Frisian JR4 guiding catheter was passed up and intubated the right coronary artery.  A BMW wire was passed into the distal PDA.  A 3.0 x 15 mm trek balloon was inflated in the proximal RCA at 14 siri and then in the mid RCA at 14 siri.  Following that we placed a 3.5 x 18 mm Xience Emily drug-eluting stent in the mid RCA at 14 siri.  We then deployed a second 3.5 x 18 mm Xience Emily drug-eluting stent into the proximal RCA at 14 siri.  I postdilated both of those stents with a 3.5 x 15 mm NC trek balloon at 20 siri.  There was 0% residual and DAMION-3 flow at that point we exchanged guides and brought a 6 Frisian Voda left 3.0 guiding catheter up intubated the left main coronary artery.  BMW wire was passed in the distal LAD I direct stented the mid LAD with a 2.5 x 18 mm Xience Emily drug-eluting stent at 14 siri.  I postdilated that with a 2.5 x 15 mm NC trek balloon at 20 siri.  There was 0% residual and DAMION-3 flow and at that point balloons wires and guides were removed and this case was halted           SUMMARY: Stuttering inferior STEMI successfully treated with angioplasty drug-eluting stenting, drug-eluting stenting to  the mid LAD     EBL:                     Minimal  Specimens:         None     PCI Segment:                   Proximal RCA                   mid RCA                           mid LAD  Pre-stenosis:                    90                                      90                                      90  Post-stenosis                   0                                        0                                        0  Lesion Type                      C                                       A                                       B1  DAMION Flow Pre                   2                                        2                                        3  DAMION Flow Post   3                                                      3                                        3  Dissection                        None                                 None                                 None        RECOMMENDATIONS inferior STEMI successfully treated with angioplasty drug-eluting stenting to the proximal and mid RCA.  Adjuvant angioplasty drug-eluting stenting the mid LAD.  Routine PCI care but she needs aggressive risk modification this is good and not be great long-term for her     Echocardiogram 11/5/2019  · Left ventricular systolic function is normal. Calculated EF = 62%. Estimated EF was in agreement with the calculated EF. Estimated EF = 62%. Normal left ventricular cavity size and wall thickness noted. All left ventricular wall segments contract normally. Left ventricular diastolic function is normal.  · Trace mitral valve regurgitation is present.  · The tricuspid valve is grossly normal. Trace tricuspid valve regurgitation is present. Estimated right ventricular systolic pressure from tricuspid regurgitation is normal (<35 mmHg). Calculated right ventricular systolic pressure from tricuspid regurgitation is 24 mmHg.  Consults:  Consults     Date and Time Order Name Status Description    11/5/2019 0226 Inpatient Endocrinology  Consult Completed           Pertinent Test Results:  Results from last 7 days   Lab Units 11/06/19  0510 11/05/19  0325   SODIUM mmol/L 139 134*   POTASSIUM mmol/L 4.1 4.0   CHLORIDE mmol/L 103 96*   CO2 mmol/L 23.7 25.1   BUN mg/dL 11 13   CREATININE mg/dL 0.63 0.62   GLUCOSE mg/dL 129* 197*   CALCIUM mg/dL 8.9 9.4     Results from last 7 days   Lab Units 11/06/19  0510   TROPONIN T ng/mL 0.755*     Results from last 7 days   Lab Units 11/06/19  0510 11/05/19  0325   WBC 10*3/mm3 8.55 16.01*   HEMOGLOBIN g/dL 13.1 13.2   HEMATOCRIT % 39.1 39.9   PLATELETS 10*3/mm3 237 292             Results from last 7 days   Lab Units 11/05/19  0325   CHOLESTEROL mg/dL 195   TRIGLYCERIDES mg/dL 115   HDL CHOL mg/dL 42         Results from last 7 days   Lab Units 11/05/19  0325   TSH uIU/mL 1.240       Discharge Medications     Discharge Medications      New Medications      Instructions Start Date   aspirin 81 MG EC tablet   81 mg, Oral, Daily   Start Date:  11/7/2019     atorvastatin 40 MG tablet  Commonly known as:  LIPITOR   40 mg, Oral, Nightly      prasugrel 10 MG tablet  Commonly known as:  EFFIENT   10 mg, Oral, Daily         Changes to Medications      Instructions Start Date   lisinopril 10 MG tablet  Commonly known as:  PRINIVIL,ZESTRIL  What changed:  how much to take   10 mg, Oral, Daily      metFORMIN 500 MG tablet  Commonly known as:  GLUCOPHAGE  What changed:  These instructions start on 11/8/2019. If you are unsure what to do until then, ask your doctor or other care provider.   1,000 mg, Oral, 2 Times Daily With Meals   Start Date:  11/8/2019        Continue These Medications      Instructions Start Date   buPROPion 100 MG tablet  Commonly known as:  WELLBUTRIN   150 mg, Oral, 2 Times Daily      glipizide 10 MG tablet  Commonly known as:  GLUCOTROL   10 mg, Oral, 2 Times Daily Before Meals         Stop These Medications    fenofibrate 160 MG tablet     lisinopril-hydrochlorothiazide 10-12.5 MG per  tablet  Commonly known as:  PRINZIDE,ZESTORETIC     simvastatin 40 MG tablet  Commonly known as:  ZOCOR            Discharge Diet:    Dietary Orders (From admission, onward)    Start     Ordered    11/05/19 0212  Diet Regular; Consistent Carbohydrate, Cardiac  Diet Effective Now     Question Answer Comment   Diet Texture / Consistency Regular    Common Modifiers Consistent Carbohydrate    Common Modifiers Cardiac        11/05/19 0214          Activity at Discharge:       Discharge disposition: home     Discharge Instructions and Follow ups:  Future Appointments   Date Time Provider Department Center   11/13/2019 10:30 AM Conchita Zarate APRN [facility] None   12/10/2019  2:20 PM Alejandro Espino MD [facility] None     Additional Instructions for the Follow-ups that You Need to Schedule     Ambulatory Referral to Cardiac Rehab   As directed        Follow-up Information     Alejandro Espino MD Follow up in 1 month(s).    Specialty:  Cardiology  Why:  and NP in 1 week.  Office will call you with appointments.  Contact information:  [address] [address] [phone]             Noah Mcnamara,  .    Specialty:  Internal Medicine  Contact information:  [address]  [facility] [address] [phone]                   Test Results Pending at Discharge:      HOLGER Phillips  11/06/19  9:21 AM    Time: Discharge >30 min

## 2019-11-06 NOTE — CONSULTS
Adult Nutrition  Assessment/PES    Patient Name:  Nelda Mckeon  YOB: 1975  MRN: [id number]  Admit Date:  11/5/2019    Assessment Date:  11/6/2019    Comments:  Diet education completed. Provided info on HH/CARB diet and provided material. Will remain available as needed.    Reason for Assessment     Row Name 11/06/19 0910          Reason for Assessment    Reason For Assessment  physician consult     Diagnosis  -- inf stemi s/p PCI to RCA and LAD, dm, htn, tob         Nutrition/Diet History     Row Name 11/06/19 0910          Nutrition/Diet History    Typical Food/Fluid Intake  stopped taking her DM meds several months ago.         Anthropometrics     Row Name 11/06/19 0911          Body Mass Index (BMI)    BMI Assessment  BMI 30-34.9: obesity grade I         Labs/Tests/Procedures/Meds     Row Name 11/06/19 0911          Labs/Procedures/Meds    Lab Results Reviewed  reviewed     Lab Results Comments  glu, A1C        Diagnostic Tests/Procedures    Diagnostic Test/Procedure Reviewed  reviewed        Medications    Pertinent Medications Reviewed  reviewed     Pertinent Medications Comments  glucotrol, insulin         Physical Findings     Row Name 11/06/19 0911          Physical Findings    Overall Physical Appearance  obese     Skin  other (see comments) terra 20           Nutrition Prescription Ordered     Row Name 11/06/19 0912          Nutrition Prescription PO    Common Modifiers  Cardiac;Consistent Carbohydrate         Evaluation of Received Nutrient/Fluid Intake     Row Name 11/06/19 0912          PO Evaluation    % PO Intake  po fine               Problem/Interventions:  Problem 1     Row Name 11/06/19 0912          Nutrition Diagnoses Problem 1    Problem 1  Knowledge Deficit     Etiology (related to)  MNT for Treatment/Condition               Intervention Goal     Row Name 11/06/19 0912          Intervention Goal    General  Maintain nutrition     PO  Tolerate PO     Weight  Appropriate  weight loss         Nutrition Intervention     Row Name 11/06/19 0912          Nutrition Intervention    RD/Tech Action  Follow Tx progress;Care plan reviewd           Education/Evaluation     Row Name 11/06/19 0912          Education    Education  Provided education regarding     Provided education regarding  Healthy eating for diabetes        Monitor/Evaluation    Monitor  Per protocol           Electronically signed by:  Margaret Loco RD  11/06/19 9:52 AM

## 2019-11-06 NOTE — DISCHARGE INSTRUCTIONS
Routine post cardiac catheterization/PCI discharge home care instructions:    1. No submerging procedure site below water for 7-10 days.  2. No lifting objects greater than 1 lbs for 3 days.  3. If groin site used, avoid climbing several flights of stairs or sitting for longer than 2 hours at a time for the next 24 hours.   4. Monitor puncture site for bleeding and/or knots;. If bleeding should occur at the groin site: lie flat, apply pressure and return to the ER. If bleeding should occur at the wrist site, apply pressure and return to the ER.  5.  You may apply a DRY Band-Aid over the puncture site if needed. Do not apply any lotions, salves or ointments to site.  6. No driving for 24 hours.  7. Return to ER for recurrent symptoms.  8. No smoking.  9. Take all medications as prescribed.

## 2019-11-06 NOTE — PAYOR COMM NOTE
"Nelda Rios (44 y.o. Female)                ATTENTION;    MEHRAN SALMERON LPN , INITIAL CLINICAL FOR REVIEW, REPLY TO UR DEPT,              NOHEMY SANTILLAN  [phone]  [phone] . PATIENT ADMITTED TO CORONARY CARE              UNIT.          Date of Birth Social Security Number Address Home Phone MRN    1975  [address] [facility] [address]  [id number]    Oriental orthodox Marital Status          Alevism Single       Admission Date Admission Type Admitting Provider Attending Provider Department, Room/Bed    19 Urgent Alejandro Espino MD Dillon, William, MD [facility] CORONARY CARE, N335/1    Discharge Date Discharge Disposition Discharge Destination         Home or Self Care              Attending Provider:  Alejandro Espino MD    Allergies:  Not on File    Isolation:  None   Infection:  None   Code Status:  CPR    Ht:  160 cm (63\")   Wt:  86.2 kg (190 lb)    Admission Cmt:  None   Principal Problem:  None   I21.11              Active Insurance as of 2019     Primary Coverage     Payor Plan Insurance Group Employer/Plan Group    [facility] [employer] [facility] [employer] [place]      Payor Plan Address Payor Plan Phone Number Payor Plan Fax Number Effective Dates    [address] [address]   3/1/2014 - None Entered    [place] [address]       Subscriber Name Subscriber Birth Date Member ID       NELDA RIOS 1975 [id number]                 Emergency Contacts      (Rel.) Home Phone Work Phone Mobile Phone    ordonezjeannette luis (Significant Other) -- -- [phone]    Hola Enma (Mother) -- -- [phone]               History & Physical      Alejandro Espino MD at 19 0214          Date of Hospital Visit: 19  Encounter Provider: Alejandro Espino MD  Place of Service: [facility] CARDIOLOGY  Patient Name: Nelda Rios  :1975  [id number]    Chief complaint: Chest pain    History of Present Illness: " This is a 44-year-old lady with a history of diabetes hypertension obesity ongoing tobacco abuse who started having substernal chest discomfort and shortness of breath about 14 hours prior to arrival this went off and on all day today and finally it would not remit this evening she went to [facility] ECG there showed an inferior STEMI but she looked pretty good at that point was not really complaining of a lot of pain initial troponin was 0.04 I recommended emergent helicopter transfer.  On arrival here she says she is not really having chest pain but again ECG looks abnormal.  She has no history of bleeding no history of lung or kidney problems no known allergies she is not on any drugs like cocaine or other illicit substances.  She really is otherwise been healthy  other than what is noted    No past medical history on file.    No past surgical history on file.    No medications prior to admission.       Current Meds  No current facility-administered medications on file prior to encounter.      No current outpatient medications on file prior to encounter.       Social History     Socioeconomic History   • Marital status: Single     Spouse name: Not on file   • Number of children: Not on file   • Years of education: Not on file   • Highest education level: Not on file       Family Hx: Non-contributory    REVIEW OF SYSTEMS:   ROS was performed and is negative except as outlined in HPI     REVIEW OF SYSTEMS:   CONSTITUTIONAL: No weight loss, fever, chills, weakness or fatigue.   HEENT: Eyes: No visual loss, blurred vision, double vision or yellow sclerae. Ears, Nose, Throat: No hearing loss, sneezing, congestion, runny nose or sore throat.   SKIN: No rash or itching.     RESPIRATORY: No shortness of breath, hemoptysis, cough or sputum.   GASTROINTESTINAL: No anorexia, nausea, vomiting or diarrhea. No abdominal pain, bright red blood per rectum or melena.  NEUROLOGICAL: No headache, dizziness, syncope,  "paralysis, numbness or tingling in the extremities.  MUSCULOSKELETAL: No muscle, back pain, joint pain or stiffness.   HEMATOLOGIC: No anemia, bleeding or bruising.   LYMPHATICS: No enlarged nodes.  PSYCHIATRIC: No history of depression, anxiety, hallucinations.   ENDOCRINOLOGIC: No reports of sweating, cold or heat intolerance. No polyuria or polydipsia.        Objective:     Vitals:    11/05/19 0200 11/05/19 0205 11/05/19 0208 11/05/19 0212   Resp: 20 20 22 20   Weight:       Height:         Body mass index is 34.72 kg/m².  Flowsheet Rows      First Filed Value   Admission Height  160 cm (63\") Documented at 11/05/2019 0142   Admission Weight  88.9 kg (196 lb) Documented at 11/05/2019 0142          General Appearance:    Alert, oriented x 3, in no acute distress   Head:    Normocephalic, without obvious abnormality, atraumatic   Ears:    Ears appear intact with no abnormalities noted   Throat:   No oral lesions, dentition good   Neck:   No adenopathy, supple, trachea midline, no thyromegaly, no carotid bruit, no JVD   Lungs:    Breath sounds are equal and  clear to auscultation    Heart:   Normal S1 and S2, RRR, no murmur/gallop or rub   Abdomen:    Normal bowel sounds, obese, soft non-tender, non-distended, no organomegaly, no guarding   Extremities:   Moves all extremities well, no edema, no cyanosis, no redness   Pulses:   Pulses palpable and equal bilaterally. Normal radial pulses   Skin:   No bleeding, bruising or rash   Lymph nodes:   No palpable adenopathy     I personally viewed and interpreted the patient's EKG/Telemetry data    Assessment:  Active Hospital Problems    Diagnosis  POA   • STEMI involving right coronary artery (CMS/[facility]) [I21.11]  Unknown      Resolved Hospital Problems   No resolved problems to display.       Plan: She is extremely high risk for coronary disease with a risk profile and as an abnormal ECG she is got to have a cath to look at her coronaries I think she probably is having a " "STEMI further decisions will be based on the findings at the time of cath but likely will end up doing a PCI        Electronically signed by Alejandro Espino MD at 19 0216       [facility] CATH LAB  [address] [facility] [address] [phone]             Nelda Mckeon   Cardiac Catheterization/Vascular Study   Order# [id number]   Reading physician: Alejandro Espino MD Ordering physician: Alejandro Espino MD Study date: 19    Procedures     Left ventriculography   Left Heart Cath   Stent BILL coronary   Percutaneous Coronary Intervention      Patient Information     Patient Name  Nelda Mckeon MRN  [id number] Sex  Female  (Age)  1975 (44 y.o.)   Race Ethnicity Encounter Category   White or  Not  or  Urgent   Patient Hx Of Height, Weight, and Vitals     Height Weight BSA (Calculated - sq m) BMI (kg/m2) Pulse BP   160 cm (63\") 88.9 kg (196 lb) 1.92 sq meters 34.79     Physicians     Panel Physicians Referring Physician Case Authorizing Physician   Alejandro Espino MD (Primary)     Admission Information     Admission Date/Time Discharge Date/Time Room/Bed   19  0121  N335/1   Pre-procedure Diagnosis     STEMI       Conclusion     CARDIAC CATHETERIZATION REPORT     Procedure: Left heart catheterization, angioplasty drug-eluting stenting to the mid and proximal RCA, direct stenting to the mid LAD     DATE OF PROCEDURE: 19     PROCEDURE PERFORMED BY: Alejandro Espino MD, [facility]     INDICATION FOR PROCEDURE: Inferior STEMI     DESCRIPTION OF PROCEDURE: After consent was obtained, access was gained in his right radial artery using a micropuncture technique. A 6-Belarusian short sheath was placed without difficulty.  Intraarterial cocktail was given.  Left ventriculography and selective injection of the left and right coronary arteries were performed using a JL-3.5 and JR-4 diagnostic catheter. Patient tolerated the procedure well without early " complication and EBL was minimal.  At the conclusion, the sheath was removed and hemostasis was obtained. The patient went to the [facility] area in stable condition.     FINDINGS:     LEFT VENTRICULOGRAPHY: The LV pressure was 114/10.  There was normal LV systolic function very small focal area of hypokinesis in the mid inferior wall little bit of calcium noted in the coronaries.  There was no mitral insufficiency or gradient across the aortic valve on pullback.     CORONARY ANGIOGRAPHY:  Left main: 20% mid  Left anterior descending: Normal approximately 90% mid lesion normal distally diagonals are free of obstructive disease  Ramus intermedius:Not present  Circumflex: Normal proximally moderate-sized OM1 with 40% proximal disease otherwise the remainder of the vessels normal faint left to right collaterals to the PDA  RCA: Is a dominant vessel.  90% proximal 90% mid lesion DAMION-2 flow     Interventional Note:  8000 units of heparin was given and the ACT was therapeutic.  Effient was given in a loading dose of 60 mg.  A 6 Dutch JR4 guiding catheter was passed up and intubated the right coronary artery.  A BMW wire was passed into the distal PDA.  A 3.0 x 15 mm trek balloon was inflated in the proximal RCA at 14 siri and then in the mid RCA at 14 siri.  Following that we placed a 3.5 x 18 mm Xience Emily drug-eluting stent in the mid RCA at 14 siri.  We then deployed a second 3.5 x 18 mm Xience Emily drug-eluting stent into the proximal RCA at 14 siri.  I postdilated both of those stents with a 3.5 x 15 mm NC trek balloon at 20 siri.  There was 0% residual and DAMION-3 flow at that point we exchanged guides and brought a 6 Dutch Voda left 3.0 guiding catheter up intubated the left main coronary artery.  BMW wire was passed in the distal LAD I direct stented the mid LAD with a 2.5 x 18 mm Xience Emily drug-eluting stent at 14 siri.  I postdilated that with a 2.5 x 15 mm NC trek balloon at 20 siri.  There was 0%  residual and DAMION-3 flow and at that point balloons wires and guides were removed and this case was halted           SUMMARY: Stuttering inferior STEMI successfully treated with angioplasty drug-eluting stenting, drug-eluting stenting to the mid LAD     EBL:                     Minimal  Specimens:         None     PCI Segment:                   Proximal RCA                   mid RCA                           mid LAD  Pre-stenosis:                    90                                      90                                      90  Post-stenosis                   0                                        0                                        0  Lesion Type                      C                                       A                                       B1  DAMION Flow Pre                   2                                        2                                        3  DAMION Flow Post   3                                                      3                                        3  Dissection                        None                                 None                                 None        RECOMMENDATIONS inferior STEMI successfully treated with angioplasty drug-eluting stenting to the proximal and mid RCA.  Adjuvant angioplasty drug-eluting stenting the mid LAD.  Routine PCI care but she needs aggressive risk modification this is good and not be great long-term for her        Alejandro Espino MD  11/05/19  2:18 AM      Radiation      Event Details User   2:12 AM Radiation Tracking Panel 1: Alejandro Espino MD   Cumulative Air Kerma (mGy) = 454.000; Fluoro Time (min) = 9.3; DAP (mGy-cm2) = 86544.000 EC   Stent Inflated      Event Details User   1:49 AM Stent Inflated Stent Xience Emily Everolimus Bryon 3.5x18mm - First balloon inflation max pressure = 14 siri. First balloon inflation duration = 10 seconds.  EC   1:51 AM Stent Inflated Stent Xience Emily Everolimus Bryon 3.5x18mm - First balloon  inflation max pressure = 14 siri. First balloon inflation duration = 8 seconds.  EC   2:02 AM Stent Inflated Stent Xience Emily Everolimus Bryon 2.5x18mm - First balloon inflation max pressure = 14 siri. First balloon inflation duration = 25 seconds.  EC   Balloon Inflated      Event Details User   1:46 AM Balloon Inflated Baln Trek Rx 3.5x15mm - First balloon inflation max pressure = 14 siri. First balloon inflation duration = 10 seconds. Second inflation of balloon - Max pressure = 14 siri. 2nd Inflation of balloon - Duration = 8 seconds. 2nd inflation was done at 01:46. The balloon is positioned in the Proximal segment of the vessel.  EC   1:53 AM Balloon Inflated Baln Trek Nc Rx 3.5x15mm - First balloon inflation max pressure = 20 siri. First balloon inflation duration = 10 seconds. Second inflation of balloon - Max pressure = 20 siri. 2nd Inflation of balloon - Duration = 10 seconds.  EC   2:04 AM Balloon Inflated Baln Trek Nc Rx 2.5x15mm - First balloon inflation max pressure = 20 siri. First balloon inflation duration = 10 seconds.  EC   Medications   (Filter: Administrations occurring from 11/05/19 0000 to 11/05/19 0224)   Medication Rate/Dose/Volume Action  Date Time    lidocaine (XYLOCAINE) 2% injection (mL) 5 mL Given 11/05/19 0126    Total dose as of 11/06/19 1123         5 mL         fentaNYL citrate (PF) (SUBLIMAZE) injection (mcg) 50 mcg Given 11/05/19 0127    Total dose as of 11/06/19 1123 50 mcg Given 0155    100 mcg         midazolam (VERSED) injection (mg) 1 mg Given 11/05/19 0127    Total dose as of 11/06/19 1123 1 mg Given 0131    4 mg 1 mg Given 0143     1 mg Given 0158    sodium chloride 0.9 % infusion (mL/hr) 125 mL/hr New Bag 11/05/19 0128    Total dose as of 11/06/19 1123   0208    Cannot be calculated         heparin (porcine) injection (Units) 7,000 Units Given 11/05/19 0141    Total dose as of 11/06/19 1123 2,000 Units Given 0212    9,000 Units         verapamil (ISOPTIN) 500 mcg, nitroglycerin  (TRIDIL) 200 mcg, heparin (porcine) 3,000 Units radial artery injection (mL) 2 mL Given 11/05/19 0156    Total dose as of 11/06/19 1123         2 mL         prasugrel (EFFIENT) tablet (mg) 60 mg Canceled Entry 11/05/19 0157    Total dose as of 11/06/19 1123 60 mg Given 0159    60 mg         iopamidol (ISOVUE-370) 76 % injection (mL) 176 mL Given 11/05/19 0207    Total dose as of 11/06/19 1123         176 mL         verapamil (ISOPTIN) 500 mcg, nitroglycerin (TRIDIL) 200 mcg, heparin (porcine) 3,000 Units radial artery injection (mL) 5 mL Given 11/05/19 0135    Total dose as of 11/06/19 1123                 Emergency Department Notes     No notes of this type exist for this encounter.        Vital Signs (last 3 days)     Date/Time   Temp   Temp src   Pulse   Resp   BP   Patient Position   SpO2    11/06/19 1021   --   --   62   16   118/70   --   97    11/06/19 0800   --   --   76   --   104/77   --   98    11/06/19 0510   --   --   57   --   93/58   --   97    11/06/19 0030   97.8 (36.6)   Oral   58   16   98/64   Lying   93    11/05/19 1845   --   --   63   --   --   --   99    11/05/19 1810   --   --   62   --   --   --   98    11/05/19 1750   --   --   65   --   --   --   100    11/05/19 1700   --   --   60   --   --   --   100    11/05/19 1615   --   --   61   --   118/76   --   99    11/05/19 1505   --   --   73   --   --   --   99    11/05/19 1405   --   --   62   --   --   --   99    11/05/19 1200   --   --   54   --   114/63   --   98    11/05/19 1105   98.1 (36.7)   Oral   57   --   --   --   98    11/05/19 1035   --   --   65   --   118/71   --   99    11/05/19 1005   --   --   65   --   --   --   99    11/05/19 0900   --   --   63   --   --   --   100    11/05/19 0805   --   --   65   --   118/71   --   99    11/05/19 0700   97.8 (36.6)   Oral   57   --   115/75   --   98    11/05/19 0600   --   --   --   --   101/67   --   --    11/05/19 0505   --   --   80   --   --   --   97    11/05/19 0500   --   --    80   --   112/73   --   95    11/05/19 0445   --   --   71   --   --   --   96    11/05/19 0430   --   --   58   --   103/64   --   96    11/05/19 0415   --   --   54   --   139/83   --   96    11/05/19 0400   --   --   --   --   121/73   --   --    11/05/19 0357   --   --   72   --   --   --   97    11/05/19 0345   --   --   61   --   117/75   --   97    11/05/19 0330   --   --   58   --   112/77   --   97    11/05/19 0315   --   --   65   --   114/74   --   95    11/05/19 0300   --   --   55   --   117/76   --   98    11/05/19 0245   98.3 (36.8)   Oral   55   18   122/80   Lying   96    11/05/19 02:17:34   --   --   --   20   --   --   --    11/05/19 02:12:57   --   --   --   20   --   --   --    11/05/19 02:08:24   --   --   --   22   --   --   --    11/05/19 02:05:13   --   --   --   20   --   --   --    11/05/19 02:00:39   --   --   --   20   --   --   --    11/05/19 01:55:53   --   --   --   24   --   --   --    11/05/19 01:53:27   --   --   --   18   --   --   --    11/05/19 01:48:44   --   --   --   18   --   --   --    11/05/19 01:44:01   --   --   --   18   --   --   --    11/05/19 01:39:02   --   --   --   18   --   --   --    11/05/19 01:34:28   --   --   --   20   --   --   --    11/05/19 01:29:56   --   --   --   20   --   --   --    11/05/19 0125   --   --   --   22   --   --   --              Oxygen Therapy (last 3 days)     Date/Time   SpO2   Device (Oxygen Therapy)   Flow (L/min)   Oxygen Concentration (%)   ETCO2 (mmHg)    11/06/19 1021   97   room air   --   --   --    11/06/19 0800   98   room air   --   --   --    11/06/19 0510   97   --   --   --   --    11/06/19 0030   93   room air   --   --   --    11/05/19 1845   99   --   --   --   --    11/05/19 1810   98   --   --   --   --    11/05/19 1750   100   --   --   --   --    11/05/19 1700   100   --   --   --   --    11/05/19 1615   99   --   --   --   --    11/05/19 1505   99   --   --   --   --    11/05/19 1405   99   --   --   --   --     11/05/19 1400   --   room air   --   --   --    11/05/19 1200   98   --   --   --   --    11/05/19 1105   98   --   --   --   --    11/05/19 1035   99   --   --   --   --    11/05/19 1005   99   --   --   --   --    11/05/19 0900   100   --   --   --   --    11/05/19 0805   99   --   --   --   --    11/05/19 0800   --   room air   --   --   --    11/05/19 0700   98   --   --   --   --    11/05/19 0505   97   --   --   --   --    11/05/19 0500   95   --   --   --   --    11/05/19 0445   96   --   --   --   --    11/05/19 0430   96   --   --   --   --    11/05/19 0415   96   --   --   --   --    11/05/19 0357   97   --   --   --   --    11/05/19 0345   97   --   --   --   --    11/05/19 0330   97   --   --   --   --    11/05/19 0315   95   --   --   --   --    11/05/19 0300   98   --   --   --   --    11/05/19 0245   96   room air   --   --   --    11/05/19 01:23:48   --   nasal cannula   3   --   --            Lines, Drains & Airways    Active LDAs     None         Inactive LDAs     Name:   Placement date:   Placement time:   Removal date:   Removal time:   Site:   Days:    [REMOVED] Peripheral IV 11/05/19 0007 Right Antecubital   11/05/19 0007    11/06/19    1003    Antecubital   1    [REMOVED] Peripheral IV 11/05/19 0430 Left;Posterior Forearm   11/05/19 0430 11/06/19    1003    Forearm   1    [REMOVED] Arterial Sheath 6 Fr. Right Radial   11/05/19    0134    11/05/19    0211    Radial   less than 1                Hospital Medications (all)       Dose Frequency Start End    acetaminophen (TYLENOL) tablet 650 mg 650 mg Every 4 Hours PRN 11/5/2019     Sig - Route: Take 2 tablets by mouth Every 4 (Four) Hours As Needed for Mild Pain  or Fever (temperature greater than 101F). - Oral    aspirin EC tablet 81 mg 81 mg Daily 11/5/2019     Sig - Route: Take 1 tablet by mouth Daily. - Oral    atorvastatin (LIPITOR) tablet 40 mg 40 mg Nightly 11/5/2019     Sig - Route: Take 2 tablets by mouth Every Night. - Oral     buPROPion (WELLBUTRIN) tablet 150 mg 150 mg Every 12 Hours Scheduled 11/6/2019     Sig - Route: Take 2 tablets by mouth Every 12 (Twelve) Hours. - Oral    dextrose (D50W) 25 g/ 50mL Intravenous Solution 25 g 25 g Every 15 Minutes PRN 11/5/2019     Sig - Route: Infuse 50 mL into a venous catheter Every 15 (Fifteen) Minutes As Needed for Low Blood Sugar (Blood Sugar Less Than 70). - Intravenous    dextrose (GLUTOSE) oral gel 15 g 15 g Every 15 Minutes PRN 11/5/2019     Sig - Route: Take 15 application by mouth Every 15 (Fifteen) Minutes As Needed for Low Blood Sugar (Blood sugar less than 70). - Oral    glipizide (GLUCOTROL) tablet 10 mg 10 mg 2 Times Daily Before Meals 11/5/2019     Sig - Route: Take 1 tablet by mouth 2 (Two) Times a Day Before Meals. - Oral    glucagon (human recombinant) (GLUCAGEN DIAGNOSTIC) injection 1 mg 1 mg Every 15 Minutes PRN 11/5/2019     Sig - Route: Inject 1 mg under the skin into the appropriate area as directed Every 15 (Fifteen) Minutes As Needed for Low Blood Sugar (Blood Glucose Less Than 70). - Subcutaneous    HYDROcodone-acetaminophen (NORCO) 5-325 MG per tablet 1 tablet 1 tablet Every 4 Hours PRN 11/5/2019 11/15/2019    Sig - Route: Take 1 tablet by mouth Every 4 (Four) Hours As Needed for Moderate Pain . - Oral    insulin lispro (humaLOG) injection 0-9 Units 0-9 Units 4 Times Daily With Meals & Nightly 11/5/2019     Sig - Route: Inject 0-9 Units under the skin into the appropriate area as directed 4 (Four) Times a Day With Meals & at Bedtime. - Subcutaneous    lisinopril (PRINIVIL,ZESTRIL) tablet 12.5 mg 12.5 mg Daily 11/6/2019     Sig - Route: Take 12.5 mg by mouth Daily. - Oral    nicotine (NICODERM CQ) 21 MG/24HR patch 1 patch 1 patch Every 24 Hours 11/5/2019     Sig - Route: Place 1 patch on the skin as directed by provider Daily. - Transdermal    ondansetron (ZOFRAN) injection 4 mg 4 mg Every 6 Hours PRN 11/5/2019     Sig - Route: Infuse 2 mL into a venous catheter Every 6  "(Six) Hours As Needed for Nausea or Vomiting. - Intravenous    Linked Group 1:  \"Or\" Linked Group Details        ondansetron (ZOFRAN) tablet 4 mg 4 mg Every 6 Hours PRN 11/5/2019     Sig - Route: Take 1 tablet by mouth Every 6 (Six) Hours As Needed for Nausea or Vomiting. - Oral    Linked Group 1:  \"Or\" Linked Group Details        prasugrel (EFFIENT) tablet 10 mg 10 mg Daily 11/6/2019     Sig - Route: Take 1 tablet by mouth Daily. - Oral    sodium chloride 0.9 % infusion  Continuous PRN 11/5/2019 11/5/2019    Sig: Continuous As Needed.    sodium chloride 0.9 % infusion 125 mL/hr Continuous 11/5/2019 11/5/2019    Sig - Route: Infuse 125 mL/hr into a venous catheter Continuous. - Intravenous    zolpidem (AMBIEN) tablet 5 mg 5 mg Nightly PRN 11/5/2019 11/15/2019    Sig - Route: Take 1 tablet by mouth At Night As Needed for Sleep. - Oral    fentaNYL citrate (PF) (SUBLIMAZE) injection (Discontinued)  As Needed 11/5/2019 11/5/2019    Sig: As Needed.    Reason for Discontinue: Patient Discharge    heparin (porcine) injection (Discontinued)  As Needed 11/5/2019 11/5/2019    Sig: As Needed.    Reason for Discontinue: Patient Discharge    iopamidol (ISOVUE-370) 76 % injection (Discontinued)  As Needed 11/5/2019 11/5/2019    Sig: As Needed.    Reason for Discontinue: Patient Discharge    lidocaine (XYLOCAINE) 2% injection (Discontinued)  As Needed 11/5/2019 11/5/2019    Sig: As Needed.    Reason for Discontinue: Patient Discharge    losartan (COZAAR) tablet 25 mg (Discontinued) 25 mg Daily 11/5/2019 11/5/2019    Sig - Route: Take 1 tablet by mouth Daily. - Oral    midazolam (VERSED) injection (Discontinued)  As Needed 11/5/2019 11/5/2019    Sig: As Needed.    Reason for Discontinue: Patient Discharge    prasugrel (EFFIENT) tablet (Discontinued)  As Needed 11/5/2019 11/5/2019    Sig: As Needed.    Reason for Discontinue: Patient Discharge    verapamil (ISOPTIN) 500 mcg, nitroglycerin (TRIDIL) 200 mcg, heparin (porcine) 3,000 " Units radial artery injection (Discontinued)  As Needed 11/5/2019 11/5/2019    Sig: As Needed.    Reason for Discontinue: Patient Discharge    verapamil (ISOPTIN) 500 mcg, nitroglycerin (TRIDIL) 200 mcg, heparin (porcine) 3,000 Units radial artery injection (Discontinued)  As Needed 11/5/2019 11/5/2019    Sig: As Needed.    Reason for Discontinue: Patient Discharge          Orders (last 72 hrs)     Start     Ordered    11/08/19 0000  metFORMIN (GLUCOPHAGE) 500 MG tablet  2 Times Daily With Meals      11/06/19 0920    11/07/19 0000  aspirin 81 MG EC tablet  Daily      11/06/19 0920    11/06/19 1100  buPROPion (WELLBUTRIN) tablet 150 mg  Every 12 Hours Scheduled      11/06/19 0907    11/06/19 0914  Discharge patient  Once      11/06/19 0920    11/06/19 0900  prasugrel (EFFIENT) tablet 10 mg  Daily      11/05/19 0214    11/06/19 0900  lisinopril (PRINIVIL,ZESTRIL) tablet 12.5 mg  Daily      11/05/19 1100    11/06/19 0811  POC Glucose Once  Once      11/06/19 0758    11/06/19 0700  ECG 12 Lead  Once,   Status:  Canceled      11/05/19 0214    11/06/19 0600  Troponin  Morning Draw     Comments:  Do not call      11/05/19 0214    11/06/19 0600  Basic Metabolic Panel  Morning Draw      11/05/19 0741    11/06/19 0600  CBC (No Diff)  Morning Draw      11/05/19 0741    11/06/19 0500  ECG 12 Lead  Tomorrow AM      11/05/19 0741    11/06/19 0000  Adult Transthoracic Echo Complete W/ Cont if Necessary Per Protocol  Once      11/05/19 0214    11/06/19 0000  Ambulatory Referral to Cardiac Rehab      11/06/19 0911    11/06/19 0000  atorvastatin (LIPITOR) 40 MG tablet  Nightly      11/06/19 0920    11/06/19 0000  lisinopril (PRINIVIL,ZESTRIL) 10 MG tablet  Daily      11/06/19 0920    11/06/19 0000  prasugrel (EFFIENT) 10 MG tablet  Daily      11/06/19 0920    11/05/19 2100  atorvastatin (LIPITOR) tablet 40 mg  Nightly      11/05/19 0214 11/05/19 2018  POC Glucose Once  Once      11/05/19 2006 11/05/19 1900  glipizide  (GLUCOTROL) tablet 10 mg  2 Times Daily Before Meals      11/05/19 1800    11/05/19 1803  Inpatient Diabetes Educator Consult  Once     Provider:  (Not yet assigned)    11/05/19 1802    11/05/19 1803  Inpatient Nutrition Consult  Once     Provider:  (Not yet assigned)    11/05/19 1802    11/05/19 1802  T4, Free  Once      11/05/19 1801    11/05/19 1802  TSH  Once      11/05/19 1801    11/05/19 1802  Microalbumin / Creatinine Urine Ratio -  Once      11/05/19 1801    11/05/19 1737  POC Glucose Once  Once      11/05/19 1714    11/05/19 1558  POC Activated Clotting Time  Once      11/05/19 0138    11/05/19 1220  POC Glucose Once  Once      11/05/19 1202    11/05/19 1200  insulin lispro (humaLOG) injection 0-9 Units  4 Times Daily With Meals & Nightly      11/05/19 1025    11/05/19 1100  POC Glucose 4x Daily AC & at Bedtime  4 Times Daily Before Meals & at Bedtime      11/05/19 1025    11/05/19 1022  Do NOT Hold Basal or Correction Scale Insulin When Patient is NPO, Hold Scheduled Mealtime (Bolus) Insulin if NPO  Continuous      11/05/19 1025    11/05/19 1022  Follow [facility] Hypoglycemia Standing Orders For Blood Glucose Less Than 70 mg/dL  Until Discontinued      11/05/19 1025    11/05/19 1022  Hypoglycemia Treatment - Alert Patient That is Not NPO and Can Safely Swallow  Until Discontinued     Comments:  Administer 4 oz Fruit Juice OR 4 oz Regular Soda OR 8 oz Milk OR 15-30 grams (1 tube) of Glucose Gel.  Recheck Blood Glucose 15 Minutes After Ingestion, Repeat Treatment & Continue to Recheck Blood Sugar Every 15 Minutes Until Blood Glucose is 70 mg/dL or Higher.  Once Blood Glucose is 70 mg/dL or Higher and if It Will Be more than 60 Minutes Until the Next Meal, Provide Appropriate Snack (Including Carbohydrate Food) Based on Meal Plan Order. Give Meal Tray As Soon As Possible.    11/05/19 1025    11/05/19 1022  Hypoglycemia Treatment - Patient Has IV Access - Unresponsive, NPO or Unable To Safely Swallow  Until  Discontinued     Comments:  Administer 25g (50ml) D50W IV Push.  Recheck Blood Glucose 15 Minutes After Administration, if Blood Glucose Remains Less Than 70 mg/dl, Repeat Treatment   Recheck Blood Glucose 15 Minutes After 2nd Administration, if Blood Glucose Remains Less Than 70 mg/dL After 2nd Dose of D50W, Contact Provider for Further Treatment Orders & Consider Adding IVF With D5W for Maintenance    11/05/19 1025    11/05/19 1022  Hypoglycemia Treatment - Patient Without IV Access - Unresponsive, NPO or Unable To Safely Swallow  Until Discontinued     Comments:  Administer 1mg Glucagon SQ & Establish IV Access.  Turn Patient on Side - Nausea / Vomiting May Occur.  Recheck Blood Glucose 15 Minutes After Administration.  If Blood Glucose Remains Less Than 70, Administer 25g D50W IV Push (50ml).  Recheck Blood Glucose 15 Minutes After Administration of D50W, if Blood Glucose Remains Less Than 70 mg/dl, Contact Provider for Further Treatment Orders & Consider Adding IVF With D5 for Maintenance    11/05/19 1025    11/05/19 1022  Hypoglycemia Treatment - Document Event & Patient Response to Interventions EMR, Document Medications on MAR  Continuous      11/05/19 1025    11/05/19 1022  Notify Provider - Hypoglycemia Treatment  Until Discontinued      11/05/19 1025    11/05/19 1021  dextrose (D50W) 25 g/ 50mL Intravenous Solution 25 g  Every 15 Minutes PRN      11/05/19 1025    11/05/19 1021  glucagon (human recombinant) (GLUCAGEN DIAGNOSTIC) injection 1 mg  Every 15 Minutes PRN      11/05/19 1025    11/05/19 1021  dextrose (GLUTOSE) oral gel 15 g  Every 15 Minutes PRN      11/05/19 1025    11/05/19 1011  Transfer Patient  Once      11/05/19 1021    11/05/19 0900  aspirin EC tablet 81 mg  Daily      11/05/19 0214    11/05/19 0900  losartan (COZAAR) tablet 25 mg  Daily,   Status:  Discontinued      11/05/19 0741    11/05/19 0740  POC Glucose Once  Once      11/05/19 0735    11/05/19 0700  Inpatient Endocrinology Consult   Once     Specialty:  Endocrinology  Provider:  Rober Gonzalez MD    11/05/19 0226    11/05/19 0618  POC Activated Clotting Time  Once      11/05/19 0208    11/05/19 0618  POC Activated Clotting Time  Once      11/05/19 0149    11/05/19 0600  CBC (No Diff)  Morning Draw      11/05/19 0214    11/05/19 0600  Basic Metabolic Panel  Morning Draw      11/05/19 0214    11/05/19 0600  Hemoglobin A1c  Morning Draw      11/05/19 0214    11/05/19 0600  Lipid Panel  Morning Draw     Comments:  Fasting      11/05/19 0214    11/05/19 0258  POC Glucose Once  Once      11/05/19 0245    11/05/19 0216  sodium chloride 0.9 % infusion  Continuous      11/05/19 0214 11/05/19 0216  nicotine (NICODERM CQ) 21 MG/24HR patch 1 patch  Every 24 Hours      11/05/19 0214    11/05/19 0215  Cardiac Monitoring  Continuous      11/05/19 0214 11/05/19 0212  zolpidem (AMBIEN) tablet 5 mg  Nightly PRN      11/05/19 0214 11/05/19 0212  DC TR Band (Per Protocol)  Per Order Details     Comments:  If Bleeding Occurs Re-Inflate 2 mL & Then Continue With 2 mL Every 15 Minute Deflations. Gently Roll Band Off Insertion Site After Completely Deflated.    11/05/19 0214 11/05/19 0212  Vital Signs & Reverse Liborio's Test (While Radial Compression Device in Place)  Per Order Details     Comments:  Every 15 Minutes x4, Every 30 Minutes x4, & Every 1 Hour x2    11/05/19 0214 11/05/19 0212  Keep Affected Arm Straight & Elevated  Continuous      11/05/19 0214 11/05/19 0212  Activity As Tolerated  Until Discontinued      11/05/19 0214 11/05/19 0212  Diet Regular; Consistent Carbohydrate, Cardiac  Diet Effective Now      11/05/19 0214 11/05/19 0211  Code Status and Medical Interventions:  Continuous      11/05/19 0214 11/05/19 0211  Obtain STAT EKG during chest pain. Notify MD of any change in rhythm, ST segments or complaints of chest pain.  Until Discontinued      11/05/19 0214 11/05/19 0211  Encourage fluids  Until Discontinued       11/05/19 0214 11/05/19 0211  Verify Discontinuation of enoxaparin (LOVENOX) and / or heparin  Once      11/05/19 0214 11/05/19 0211  If patient on Metformin (Glucophage) hold for 48 hours.  Until Discontinued      11/05/19 0214 11/05/19 0211  Notify MD if platelet count is less than 100,000, is less than 1/2 baseline, or if Hgb drops by more than 3mg/dl.  Until Discontinued      11/05/19 0214 11/05/19 0211  Notify MD of hypotension (SBP less than 95), bleeding, or dysrythmia and follow Sheath Removal Policy if needed.  Until Discontinued      11/05/19 0214 11/05/19 0211  Oxygen Therapy- Nasal Cannula; Titrate for SPO2: equal to or greater than, 92%, per policy  Continuous      11/05/19 0214 11/05/19 0211  Continuous Pulse Oximetry  Continuous      11/05/19 0214 11/05/19 0211  Advance diet as tolerated  Until Discontinued      11/05/19 0214 11/05/19 0211  Cardiac Rehab Evaluation and Enrollment  Once     Provider:  (Not yet assigned)    11/05/19 0214 11/05/19 0211  ECG 12 Lead  STAT      11/05/19 0214 11/05/19 0211  Inpatient Admission  Once      11/05/19 0214 11/05/19 0210  acetaminophen (TYLENOL) tablet 650 mg  Every 4 Hours PRN      11/05/19 0214 11/05/19 0210  HYDROcodone-acetaminophen (NORCO) 5-325 MG per tablet 1 tablet  Every 4 Hours PRN      11/05/19 0214    11/05/19 0210  ondansetron (ZOFRAN) tablet 4 mg  Every 6 Hours PRN      11/05/19 0214 11/05/19 0210  ondansetron (ZOFRAN) injection 4 mg  Every 6 Hours PRN      11/05/19 0214 11/05/19 0207  iopamidol (ISOVUE-370) 76 % injection  As Needed,   Status:  Discontinued      11/05/19 0207 11/05/19 0157  prasugrel (EFFIENT) tablet  As Needed,   Status:  Discontinued      11/05/19 0157 11/05/19 0156  verapamil (ISOPTIN) 500 mcg, nitroglycerin (TRIDIL) 200 mcg, heparin (porcine) 3,000 Units radial artery injection  As Needed,   Status:  Discontinued      11/05/19 0157    11/05/19 0142  heparin (porcine) injection   As Needed,   Status:  Discontinued      11/05/19 0143    11/05/19 0135  verapamil (ISOPTIN) 500 mcg, nitroglycerin (TRIDIL) 200 mcg, heparin (porcine) 3,000 Units radial artery injection  As Needed,   Status:  Discontinued      11/05/19 0220    11/05/19 0128  sodium chloride 0.9 % infusion  Continuous PRN      11/05/19 0128 11/05/19 0127  midazolam (VERSED) injection  As Needed,   Status:  Discontinued      11/05/19 0127 11/05/19 0127  fentaNYL citrate (PF) (SUBLIMAZE) injection  As Needed,   Status:  Discontinued      11/05/19 0127 11/05/19 0126  lidocaine (XYLOCAINE) 2% injection  As Needed,   Status:  Discontinued      11/05/19 0126 11/05/19 0117  Cardiac Catheterization/Vascular Study  Once      11/05/19 0116    Unscheduled  Change site dressing  As Needed      11/05/19 0214    Unscheduled  Vital Signs  As Needed      11/05/19 0214    --  metFORMIN (GLUCOPHAGE) 500 MG tablet  2 Times Daily With Meals,   Status:  Discontinued      11/05/19 0530    --  buPROPion (WELLBUTRIN) 100 MG tablet  2 Times Daily      11/05/19 0530    --  lisinopril (PRINIVIL,ZESTRIL) 10 MG tablet  Daily,   Status:  Discontinued      11/05/19 0936    --  simvastatin (ZOCOR) 40 MG tablet  Nightly      11/05/19 0936    --  glipizide (GLUCOTROL) 10 MG tablet  2 Times Daily Before Meals      11/05/19 0936    --  fenofibrate 160 MG tablet  Daily      11/05/19 0936    --  lisinopril-hydrochlorothiazide (PRINZIDE,ZESTORETIC) 10-12.5 MG per tablet  Daily      11/06/19 0901             Physician Progress Notes (last 72 hours) (Notes from 11/03/19 1120 through 11/06/19 1120)      Paco Charles MD at 11/06/19 0902            ENDOCRINE    Subjective   AND PLANS  Nelda Mckeon is a 44 y.o. female.     Follow-up diabetes and related disorders    No chest pain or shortness of breath.  No nausea or vomiting.  Fasting glucose 129.  Random glucose 166-206.    Continue glipizide 10 mg twice daily  Restart metformin 1000 mg twice daily  "tomorrow.  Suggest switching from glipizide to Jardiance as an outpatient.  Have discussed this recommendation with the patient.  Consultation note was sent to PCP Dr. Noah Mcnamara.    Normal thyroid function tests.  .  HDL 42.  Continue Lipitor 40 mg/day.  Discussed with patient about the need to take her medications regularly.    Urine microalbumin normal.  Continue lisinopril    Patient must discontinue smoking.  Suggest restarting Wellbutrin    Objective   /77   Pulse 76   Temp 97.8 °F (36.6 °C) (Oral)   Resp 16   Ht 160 cm (63\")   Wt 86.2 kg (190 lb)   LMP 11/03/2019   SpO2 97%   BMI 33.66 kg/m²    Physical Exam    Awake, alert, not in distress.  No rales or wheezes.  Regular heart rate and rhythm.  Abdomen soft, nontender.  Extremities warm.  No cyanosis or pedal edema.    Lab Results (last 24 hours)     Procedure Component Value Units Date/Time    POC Glucose Once [[id number]]  (Abnormal) Collected:  11/06/19 0758    Specimen:  Blood Updated:  11/06/19 0811     Glucose 151 mg/dL     Troponin [[id number]]  (Abnormal) Collected:  11/06/19 0510    Specimen:  Blood Updated:  11/06/19 0651     Troponin T 0.755 ng/mL     Narrative:       Troponin T Reference Range:  <= 0.03 ng/mL-   Negative for AMI  >0.03 ng/mL-     Abnormal for myocardial necrosis.  Clinicians would have to utilize clinical acumen, EKG, Troponin and serial changes to determine if it is an Acute Myocardial Infarction or myocardial injury due to an underlying chronic condition.     Basic Metabolic Panel [[id number]]  (Abnormal) Collected:  11/06/19 0510    Specimen:  Blood Updated:  11/06/19 0626     Glucose 129 mg/dL      BUN 11 mg/dL      Creatinine 0.63 mg/dL      Sodium 139 mmol/L      Potassium 4.1 mmol/L      Chloride 103 mmol/L      CO2 23.7 mmol/L      Calcium 8.9 mg/dL      eGFR Non African Amer 102 mL/min/1.73      BUN/Creatinine Ratio 17.5     Anion Gap 12.3 mmol/L     Narrative:       GFR Normal >60  Chronic Kidney Disease " <60  Kidney Failure <15    CBC (No Diff) [[id number]]  (Normal) Collected:  11/06/19 0510    Specimen:  Blood Updated:  11/06/19 0547     WBC 8.55 10*3/mm3      RBC 4.44 10*6/mm3      Hemoglobin 13.1 g/dL      Hematocrit 39.1 %      MCV 88.1 fL      MCH 29.5 pg      MCHC 33.5 g/dL      RDW 13.1 %      RDW-SD 42.4 fl      MPV 10.1 fL      Platelets 237 10*3/mm3     POC Glucose Once [[id number]]  (Abnormal) Collected:  11/05/19 2006    Specimen:  Blood Updated:  11/05/19 2017     Glucose 206 mg/dL     Microalbumin / Creatinine Urine Ratio - Urine, Clean Catch [[id number]] Collected:  11/05/19 1841    Specimen:  Urine, Clean Catch Updated:  11/05/19 1949     Microalbumin/Creatinine Ratio 29.9 mg/g      Creatinine, Urine 70.2 mg/dL      Microalbumin, Urine 2.1 mg/dL     T4, Free [[id number]]  (Normal) Collected:  11/05/19 0325    Specimen:  Blood Updated:  11/05/19 1848     Free T4 1.45 ng/dL     TSH [[id number]]  (Normal) Collected:  11/05/19 0325    Specimen:  Blood Updated:  11/05/19 1848     TSH 1.240 uIU/mL     POC Glucose Once [[id number]]  (Abnormal) Collected:  11/05/19 1714    Specimen:  Blood Updated:  11/05/19 1736     Glucose 166 mg/dL     POC Activated Clotting Time [[id number]]  (Abnormal) Collected:  11/05/19 0138    Specimen:  Blood Updated:  11/05/19 1557     Activated Clotting Time  169 Seconds      Comment: Serial Number: [id number]:  [id number]       POC Glucose Once [[id number]]  (Abnormal) Collected:  11/05/19 1202    Specimen:  Blood Updated:  11/05/19 1219     Glucose 185 mg/dL               STEMI involving right coronary artery (CMS/HCC)    Noncompliance with medications    Poorly controlled type 2 diabetes mellitus (CMS/HCC)    Hyperlipidemia    Essential hypertension    Tobacco abuse    Diabetes therapy as discussed above.  Lipid therapy as discussed above.  Continue lisinopril  Suggest restarting Wellbutrin.  Followup with Dr. Noah Mcnamara in 2 to 3 weeks.          Electronically signed by Paco Charles  "MD TATI at 11/06/19 0908     Alejandro Espino MD at 11/05/19 0911          Nelda Mckeon  1975 44 y.o.  [id number]      Patient Care Team:  Noah Mcnamara DO as PCP - General (Internal Medicine)    CC: inf stemi s/p PCI to RCA and LAD, dm, htn, tob    Interval History: Doing well      Objective   Vital Signs  Temp:  [97.8 °F (36.6 °C)-98.3 °F (36.8 °C)] 97.8 °F (36.6 °C)  Heart Rate:  [54-80] 57  Resp:  [18-24] 18  BP: (101-139)/(64-83) 115/75    Intake/Output Summary (Last 24 hours) at 11/5/2019 0911  Last data filed at 11/5/2019 0829  Gross per 24 hour   Intake 1130 ml   Output 800 ml   Net 330 ml     Flowsheet Rows      First Filed Value   Admission Height  160 cm (63\") Documented at 11/05/2019 0142   Admission Weight  88.9 kg (196 lb) Documented at 11/05/2019 0142          Physical Exam:   General Appearance:    Alert,oriented, in no acute distress   Lungs:     Clear to auscultation,BS are equal    Heart:    Normal S1 and S2, RRR without murmur, gallop or rub   HEENT:    Sclerae are clear, no JVD or adenopathy   Abdomen:     Normal bowel sounds, soft non-tender, non-distended, no HSM   Extremities:   Moves all extremities well, no edema, no cyanosis, no             Redness, no rash     Medication Review:        aspirin 81 mg Oral Daily   atorvastatin 40 mg Oral Nightly   losartan 25 mg Oral Daily   nicotine 1 patch Transdermal Q24H   [START ON 11/6/2019] prasugrel 10 mg Oral Daily       sodium chloride 125 mL/hr Last Rate: 125 mL/hr (11/05/19 0429)         I reviewed the patient's new clinical results.  I personally viewed and interpreted the patient's EKG/Telemetry data    Assessment/Plan  Active Hospital Problems    Diagnosis  POA   • STEMI involving right coronary artery (CMS/HCC) [I21.11]  Unknown      Resolved Hospital Problems   No resolved problems to display.       Transfer to [place]; inc activity; aggressive risk mod; endo consult; [facility] pci care; look to dc tomorrow; SGLT2 inhibitor?    Alejandro" MD Srinivas  11/05/19  9:11 AM              Electronically signed by Alejandro Espino MD at 11/05/19 0915          Consult Notes (last 72 hours) (Notes from 11/03/19 1120 through 11/06/19 1120)      Paco Charles MD at 11/05/19 1803      Consult Orders    1. Inpatient Endocrinology Consult [[id number]] ordered by Alejandro Espino MD at 11/05/19 0226              ENDOCRINOLOGY CONSULTATION    CONSULTING PHYSICIAN:  Paco Chrales MD    REQUESTING PHYSICIAN:  Alejandro Espino MD    REASON FOR CONSULTATION:  Treatment of diabetes mellitus.    HISTORY OF PRESENT ILLNESS:  The patient is a 44-year-old female who was transferred from [facility] where she was initially seen for an acute inferior wall myocardial infarction.  She underwent emergent cardiac catheterization and angioplasty with stent to the left anterior descending and right coronary artery.  Immediate postop course has been uneventful.  Hemoglobin A1c on admission is markedly elevated at 9.74%.    She has known diabetes mellitus since 2017.  She has been on glipizide 10 mg b.i.d., and metformin 1000 mg b.i.d., and was in good control until several months ago she stopped taking all her medications regularly due to multiple life stressors.  Her hemoglobin A1c in the past has been at 5%.      Her last eye examination was 4 years ago.  She denies any retinopathy.  She denies blurry vision.  She denies any associated nephropathy.  She denies numbness, tingling or burning in her hands or feet.    She has hyperlipidemia and is supposed to be on Zocor 40 mg per day and fenofibrate 160 mg per day.  She has not been taking these medications regularly.    She has hypertension and is supposed to be on lisinopril/HCT 10/12.5 mg two tablets once a day, but she has not been taking these medications regularly.    PAST MEDICAL HISTORY:  1.  History of anxiety and has been on Wellbutrin.  2.  She has had a previous cholecystectomy.    ALLERGIES:  No known drug  allergies.    MENSTRUAL/OBSTETRICAL HISTORY:  She is  1, para 1.  She has regular menstrual periods.  She is not on birth control pills.  Her partner had a previous vasectomy.    SOCIAL HISTORY:  The patient is single.  She smokes 1 pack of cigarettes per day for 50 years.  She denies tobacco related illness.  She denies alcohol or drug abuse.    FAMILY HISTORY:  No history of diabetes mellitus.  Her father  of a heart attack at age 48.  Her paternal grandmother  of heart attack at age 54.  Her paternal grandfather  of heart attack at age 52.    REVIEW OF SYSTEMS:  She has lost 25 pounds over the past 3 months.  She denies any nausea or vomiting.  She has intermittent diarrhea, which she attributes to the metformin that she has been taking.  She denies fevers, chills, cough or cold symptoms.  She denies wheezing or shortness of breath.  She is not longer having any chest pain.  She denies fever or chills.  She denies dysuria, polyuria or polydipsia.  She denies melena, hematochezia, hematemesis, hematuria.  She denies seizures or loss of consciousness.    PHYSICAL EXAMINATION:  VITAL SIGNS:  Blood pressure is 118/76, respiratory rate 61, heart rate 98.1 degrees Fahrenheit.  GENERAL:  The patient is awake, alert, oriented.  Weight is 86.2 kg, BMI 34.  No dyspneic.  Not orthopneic.  Not in acute distress.  HEENT:  Pink conjunctivae.  Sclerae is anicteric.  No xanthelasma.  Full extraocular muscle movement.  No facial asymmetry.  Speech is fluent.  NECK:  No carotid bruit.  The thyroid is not enlarged.  No cervical lymphadenopathy.  CHEST:  Equal chest expansion.  No rales or wheezes.  HEART:  Regular heart rate and rhythm.  No gallop or murmur  ABDOMEN:  Soft, globular, nontender.  Bowel sounds are active.  No palpable masses.  EXTREMITIES:  Warm.  No cyanosis, pedal edema or clubbing.  Light touch sensation is grossly intact.    IMPRESSION:  1.  Acute inferior wall myocardial infarction status post  angioplasty with stent to the LAD and RCA.  2.  Poorly controlled type 2 diabetes mellitus.  3.  Hyperlipidemia.  4.  Hypertension.  5.  Tobacco abuse.  6.  Medication noncompliance.    RECOMMENDATIONS:  Restart glipizide 10 mg b.i.d.  Hold metformin for 48 hours and switch to metformin ER 1000 mg b.i.d.  Consider switching from glipizide to Jardiance as an outpatient for diabetes control, weight loss and secondary prevention of heart disease.  Will defer blood pressure control to cardiologist.  Continue Lipitor 40 mg once a day.  Recheck lipid profile in 4-6 weeks.  Check thyroid function test and urine microalbumin.    Thank you for the consultation.  I will follow the patient with you during her hospital stay.        Electronically signed by Paco Charles MD at 19          Discharge Summary      Conchita Zarate APRN at 19 0758              Hospital Discharge    Patient Name: Nelda Mckeon  Age/Sex: 44 y.o. female  : 1975  MRN: [id number]    Encounter Provider: HOLGER Phillips  Referring Provider: Alejandro Espino MD  Place of Service: [facility] CARDIOLOGY  Patient Care Team:  Noah Mcnamara DO as PCP - General (Internal Medicine)         Date of Discharge:  2019   Date of Admit: 2019    Discharge Condition: Stable  Discharge Diagnosis:    STEMI involving right coronary artery (CMS/HCC)    Noncompliance with medications    Poorly controlled type 2 diabetes mellitus (CMS/HCC)    Hyperlipidemia    Essential hypertension    Tobacco abuse      Hospital Course:   Nelda Mckeon is a 44 y.o. female who presented to [facility] on 2019 with substernal chest discomfort and shortness of breath.  EKG showed an inferior STEMI.  Her initial troponin was 0.04 and she was emergently transferred to [facility] via helicopter.  She was taken urgently for cardiac catheterization which revealed 20% mid left main, 90% mid LAD,  40% proximal OM1, 90% proximal RCA, and 90% mid RCA.  She underwent successful angioplasty and drug-eluting stenting to the proximal RCA, mid RCA, and mid LAD.  She was started on DAPT with aspirin and Effient.  Postprocedure echocardiogram revealed normal LV function and size with an estimated EF of 62% and no significant valvular abnormalities.  Her blood pressure is too low for beta-blockade.  This should be reassessed at follow-up.  She was seen by endocrinology for diabetes management.  They recommended switching from glipizide to Jardiance outpatient. She will continue atorvastatin 40 mg and will need a repeat lipid panel and LFTs in 4-6 weeks.  Aggressive risk factor modification is crucial.  This morning she is resting in bed with no complaints of chest pain or shortness of breath.  Her troponin is elevated this morning but her EKG looks stable.  She is appropriate for discharge and will follow-up with myself or Allison CRAWFORD in 1 week and Dr. Espino in 1 month.  We will get her enrolled in cardiac rehab at [facility].    Objective:  Temp:  [97.8 °F (36.6 °C)-98.1 °F (36.7 °C)] 97.8 °F (36.6 °C)  Heart Rate:  [54-76] 76  Resp:  [16] 16  BP: ()/(58-77) 104/77    Intake/Output Summary (Last 24 hours) at 11/6/2019 0921  Last data filed at 11/5/2019 2045  Gross per 24 hour   Intake 1420 ml   Output --   Net 1420 ml     Body mass index is 33.66 kg/m².      11/05/19  0142 11/05/19  0430 11/05/19  1035   Weight: 88.9 kg (196 lb) 86.5 kg (190 lb 11.2 oz) 86.2 kg (190 lb)     Weight change: -2.722 kg ()    Physical Exam:  Physical Exam   Constitutional: She is oriented to person, place, and time. She appears well-developed and well-nourished. No distress.   HENT:   Head: Normocephalic and atraumatic.   Eyes: Pupils are equal, round, and reactive to light.   Neck: No JVD present. No thyromegaly present.   Cardiovascular: Normal rate, regular rhythm, normal heart sounds and intact distal pulses.   No  murmur heard.  Pulmonary/Chest: Effort normal and breath sounds normal. No respiratory distress.   Abdominal: Soft. Bowel sounds are normal. She exhibits no distension. There is no splenomegaly or hepatomegaly. There is no tenderness.   Musculoskeletal: Normal range of motion. She exhibits no edema.   Neurological: She is alert and oriented to person, place, and time.   Skin: Skin is warm and dry. She is not diaphoretic. No erythema.   Radial site well healed without erythema or edema. Proximal and distal pulses palpable. Good capillary refill.   Psychiatric: She has a normal mood and affect. Her behavior is normal. Judgment normal.       Procedures Performed  Procedure(s):  Left Heart Cath  Stent BILL coronary  Left ventriculography  Percutaneous Coronary Intervention        Cardiac Catheterization 11/5/2019  FINDINGS:     LEFT VENTRICULOGRAPHY: The LV pressure was 114/10.  There was normal LV systolic function very small focal area of hypokinesis in the mid inferior wall little bit of calcium noted in the coronaries.  There was no mitral insufficiency or gradient across the aortic valve on pullback.     CORONARY ANGIOGRAPHY:  Left main: 20% mid  Left anterior descending: Normal approximately 90% mid lesion normal distally diagonals are free of obstructive disease  Ramus intermedius:Not present  Circumflex: Normal proximally moderate-sized OM1 with 40% proximal disease otherwise the remainder of the vessels normal faint left to right collaterals to the PDA  RCA: Is a dominant vessel.  90% proximal 90% mid lesion DAMION-2 flow     Interventional Note:  8000 units of heparin was given and the ACT was therapeutic.  Effient was given in a loading dose of 60 mg.  A 6 Stateless JR4 guiding catheter was passed up and intubated the right coronary artery.  A BMW wire was passed into the distal PDA.  A 3.0 x 15 mm trek balloon was inflated in the proximal RCA at 14 siri and then in the mid RCA at 14 siri.  Following that we placed a  3.5 x 18 mm Xience Emily drug-eluting stent in the mid RCA at 14 siri.  We then deployed a second 3.5 x 18 mm Xience Emily drug-eluting stent into the proximal RCA at 14 siri.  I postdilated both of those stents with a 3.5 x 15 mm NC trek balloon at 20 siri.  There was 0% residual and DAMION-3 flow at that point we exchanged guides and brought a 6 Greenlandic Voda left 3.0 guiding catheter up intubated the left main coronary artery.  BMW wire was passed in the distal LAD I direct stented the mid LAD with a 2.5 x 18 mm Xience Emily drug-eluting stent at 14 siri.  I postdilated that with a 2.5 x 15 mm NC trek balloon at 20 siri.  There was 0% residual and DAMION-3 flow and at that point balloons wires and guides were removed and this case was halted           SUMMARY: Stuttering inferior STEMI successfully treated with angioplasty drug-eluting stenting, drug-eluting stenting to the mid LAD     EBL:                     Minimal  Specimens:         None     PCI Segment:                   Proximal RCA                   mid RCA                           mid LAD  Pre-stenosis:                    90                                      90                                      90  Post-stenosis                   0                                        0                                        0  Lesion Type                      C                                       A                                       B1  DAMION Flow Pre                   2                                        2                                        3  DAMION Flow Post   3                                                      3                                        3  Dissection                        None                                 None                                 None        RECOMMENDATIONS inferior STEMI successfully treated with angioplasty drug-eluting stenting to the proximal and mid RCA.  Adjuvant angioplasty drug-eluting stenting the mid LAD.   Routine PCI care but she needs aggressive risk modification this is good and not be great long-term for her     Echocardiogram 11/5/2019  · Left ventricular systolic function is normal. Calculated EF = 62%. Estimated EF was in agreement with the calculated EF. Estimated EF = 62%. Normal left ventricular cavity size and wall thickness noted. All left ventricular wall segments contract normally. Left ventricular diastolic function is normal.  · Trace mitral valve regurgitation is present.  · The tricuspid valve is grossly normal. Trace tricuspid valve regurgitation is present. Estimated right ventricular systolic pressure from tricuspid regurgitation is normal (<35 mmHg). Calculated right ventricular systolic pressure from tricuspid regurgitation is 24 mmHg.  Consults:  Consults     Date and Time Order Name Status Description    11/5/2019 0226 Inpatient Endocrinology Consult Completed           Pertinent Test Results:  Results from last 7 days   Lab Units 11/06/19  0510 11/05/19  0325   SODIUM mmol/L 139 134*   POTASSIUM mmol/L 4.1 4.0   CHLORIDE mmol/L 103 96*   CO2 mmol/L 23.7 25.1   BUN mg/dL 11 13   CREATININE mg/dL 0.63 0.62   GLUCOSE mg/dL 129* 197*   CALCIUM mg/dL 8.9 9.4     Results from last 7 days   Lab Units 11/06/19  0510   TROPONIN T ng/mL 0.755*     Results from last 7 days   Lab Units 11/06/19  0510 11/05/19  0325   WBC 10*3/mm3 8.55 16.01*   HEMOGLOBIN g/dL 13.1 13.2   HEMATOCRIT % 39.1 39.9   PLATELETS 10*3/mm3 237 292             Results from last 7 days   Lab Units 11/05/19  0325   CHOLESTEROL mg/dL 195   TRIGLYCERIDES mg/dL 115   HDL CHOL mg/dL 42         Results from last 7 days   Lab Units 11/05/19  0325   TSH uIU/mL 1.240       Discharge Medications     Discharge Medications      New Medications      Instructions Start Date   aspirin 81 MG EC tablet   81 mg, Oral, Daily   Start Date:  11/7/2019     atorvastatin 40 MG tablet  Commonly known as:  LIPITOR   40 mg, Oral, Nightly      prasugrel 10 MG  tablet  Commonly known as:  EFFIENT   10 mg, Oral, Daily         Changes to Medications      Instructions Start Date   lisinopril 10 MG tablet  Commonly known as:  PRINIVIL,ZESTRIL  What changed:  how much to take   10 mg, Oral, Daily      metFORMIN 500 MG tablet  Commonly known as:  GLUCOPHAGE  What changed:  These instructions start on 11/8/2019. If you are unsure what to do until then, ask your doctor or other care provider.   1,000 mg, Oral, 2 Times Daily With Meals   Start Date:  11/8/2019        Continue These Medications      Instructions Start Date   buPROPion 100 MG tablet  Commonly known as:  WELLBUTRIN   150 mg, Oral, 2 Times Daily      glipizide 10 MG tablet  Commonly known as:  GLUCOTROL   10 mg, Oral, 2 Times Daily Before Meals         Stop These Medications    fenofibrate 160 MG tablet     lisinopril-hydrochlorothiazide 10-12.5 MG per tablet  Commonly known as:  PRINZIDE,ZESTORETIC     simvastatin 40 MG tablet  Commonly known as:  ZOCOR            Discharge Diet:    Dietary Orders (From admission, onward)    Start     Ordered    11/05/19 0212  Diet Regular; Consistent Carbohydrate, Cardiac  Diet Effective Now     Question Answer Comment   Diet Texture / Consistency Regular    Common Modifiers Consistent Carbohydrate    Common Modifiers Cardiac        11/05/19 0214          Activity at Discharge:       Discharge disposition: home     Discharge Instructions and Follow ups:  Future Appointments   Date Time Provider Department Center   11/13/2019 10:30 AM Conchita Zarate APRN [facility] None   12/10/2019  2:20 PM Alejandro Espino MD [facility] None     Additional Instructions for the Follow-ups that You Need to Schedule     Ambulatory Referral to Cardiac Rehab   As directed        Follow-up Information     Alejandro Espino MD Follow up in 1 month(s).    Specialty:  Cardiology  Why:  and NP in 1 week.  Office will call you with appointments.  [facility] information:  [address] [facility]  [address]  [facility] [address]  [phone]             Noah Mcnamara, DO .    Specialty:  Internal Medicine  Contact information:  [address]  [facility] [address] [phone]                   Test Results Pending at Discharge:      HOLGER Phillips  11/06/19  9:21 AM    Time: Discharge >30 min    Electronically signed by Conchita Zarate APRN at 11/06/19 0954

## 2019-11-06 NOTE — NURSING NOTE
Discharge complete, awaiting patient's fiance to drive patient home. Vitals stable, denies pain and soa, AAO X 4. Education provided on new medications and side effects. Follow up in 1 week with APRN, patient agrees.

## 2019-11-06 NOTE — TELEPHONE ENCOUNTER
----- Message from HOLGER Hawkins sent at 11/6/2019  9:10 AM EST -----  Please fax order for cardiac rehab to [facility].

## 2019-11-06 NOTE — TELEPHONE ENCOUNTER
Faxed Cardiac Rehab Referral and demographics to [facility] @ [phone]. Confirmation Received.     MANNIE Liu

## 2019-11-06 NOTE — PLAN OF CARE
Problem: Patient Care Overview  Goal: Plan of Care Review   11/06/19 0523   Plan of Care Review   Progress improving   OTHER   Outcome Summary Remains in CCU as an overflow. Denies any pain or shortness of breath. Rested well throughout the night. Will continue to monitor    Coping/Psychosocial   Plan of Care Reviewed With patient

## 2019-11-06 NOTE — PROGRESS NOTES
"  ENDOCRINE    Subjective   AND PLANS  Nelda Mckeon is a 44 y.o. female.     Follow-up diabetes and related disorders    No chest pain or shortness of breath.  No nausea or vomiting.  Fasting glucose 129.  Random glucose 166-206.    Continue glipizide 10 mg twice daily  Restart metformin 1000 mg twice daily tomorrow.  Suggest switching from glipizide to Jardiance as an outpatient.  Have discussed this recommendation with the patient.  Consultation note was sent to PCP Dr. Noah Mcnamara.    Normal thyroid function tests.  .  HDL 42.  Continue Lipitor 40 mg/day.  Discussed with patient about the need to take her medications regularly.    Urine microalbumin normal.  Continue lisinopril    Patient must discontinue smoking.  Suggest restarting Wellbutrin    Objective   /77   Pulse 76   Temp 97.8 °F (36.6 °C) (Oral)   Resp 16   Ht 160 cm (63\")   Wt 86.2 kg (190 lb)   LMP 11/03/2019   SpO2 97%   BMI 33.66 kg/m²   Physical Exam    Awake, alert, not in distress.  No rales or wheezes.  Regular heart rate and rhythm.  Abdomen soft, nontender.  Extremities warm.  No cyanosis or pedal edema.    Lab Results (last 24 hours)     Procedure Component Value Units Date/Time    POC Glucose Once [[id number]]  (Abnormal) Collected:  11/06/19 0758    Specimen:  Blood Updated:  11/06/19 0811     Glucose 151 mg/dL     Troponin [[id number]]  (Abnormal) Collected:  11/06/19 0510    Specimen:  Blood Updated:  11/06/19 0651     Troponin T 0.755 ng/mL     Narrative:       Troponin T Reference Range:  <= 0.03 ng/mL-   Negative for AMI  >0.03 ng/mL-     Abnormal for myocardial necrosis.  Clinicians would have to utilize clinical acumen, EKG, Troponin and serial changes to determine if it is an Acute Myocardial Infarction or myocardial injury due to an underlying chronic condition.     Basic Metabolic Panel [[id number]]  (Abnormal) Collected:  11/06/19 0510    Specimen:  Blood Updated:  11/06/19 0626     Glucose 129 mg/dL      BUN " 11 mg/dL      Creatinine 0.63 mg/dL      Sodium 139 mmol/L      Potassium 4.1 mmol/L      Chloride 103 mmol/L      CO2 23.7 mmol/L      Calcium 8.9 mg/dL      eGFR Non African Amer 102 mL/min/1.73      BUN/Creatinine Ratio 17.5     Anion Gap 12.3 mmol/L     Narrative:       GFR Normal >60  Chronic Kidney Disease <60  Kidney Failure <15    CBC (No Diff) [[id number]]  (Normal) Collected:  11/06/19 0510    Specimen:  Blood Updated:  11/06/19 0547     WBC 8.55 10*3/mm3      RBC 4.44 10*6/mm3      Hemoglobin 13.1 g/dL      Hematocrit 39.1 %      MCV 88.1 fL      MCH 29.5 pg      MCHC 33.5 g/dL      RDW 13.1 %      RDW-SD 42.4 fl      MPV 10.1 fL      Platelets 237 10*3/mm3     POC Glucose Once [[id number]]  (Abnormal) Collected:  11/05/19 2006    Specimen:  Blood Updated:  11/05/19 2017     Glucose 206 mg/dL     Microalbumin / Creatinine Urine Ratio - Urine, Clean Catch [[id number]] Collected:  11/05/19 1841    Specimen:  Urine, Clean Catch Updated:  11/05/19 1949     Microalbumin/Creatinine Ratio 29.9 mg/g      Creatinine, Urine 70.2 mg/dL      Microalbumin, Urine 2.1 mg/dL     T4, Free [[id number]]  (Normal) Collected:  11/05/19 0325    Specimen:  Blood Updated:  11/05/19 1848     Free T4 1.45 ng/dL     TSH [[id number]]  (Normal) Collected:  11/05/19 0325    Specimen:  Blood Updated:  11/05/19 1848     TSH 1.240 uIU/mL     POC Glucose Once [[id number]]  (Abnormal) Collected:  11/05/19 1714    Specimen:  Blood Updated:  11/05/19 1736     Glucose 166 mg/dL     POC Activated Clotting Time [[id number]]  (Abnormal) Collected:  11/05/19 0138    Specimen:  Blood Updated:  11/05/19 1557     Activated Clotting Time  169 Seconds      Comment: Serial Number: [id number]:  [id number]       POC Glucose Once [[id number]]  (Abnormal) Collected:  11/05/19 1202    Specimen:  Blood Updated:  11/05/19 1219     Glucose 185 mg/dL               STEMI involving right coronary artery (CMS/HCC)    Noncompliance with medications    Poorly controlled  type 2 diabetes mellitus (CMS/HCC)    Hyperlipidemia    Essential hypertension    Tobacco abuse    Diabetes therapy as discussed above.  Lipid therapy as discussed above.  Continue lisinopril  Suggest restarting Wellbutrin.  Followup with Dr. Noah Mcnamara in 2 to 3 weeks.

## 2019-11-07 ENCOUNTER — READMISSION MANAGEMENT (OUTPATIENT)
Dept: CALL CENTER | Facility: HOSPITAL | Age: 44
End: 2019-11-07

## 2019-11-08 NOTE — OUTREACH NOTE
Prep Survey      Responses   Facility patient discharged from?  [place]   Is patient eligible?  Yes   Discharge diagnosis   STEMI    Does the patient have one of the following disease processes/diagnoses(primary or secondary)?  Acute MI (STEMI,NSTEMI)   Does the patient have Home health ordered?  No   Is there a DME ordered?  No   Prep survey completed?  Yes          Nhung Galicia RN

## 2019-11-11 ENCOUNTER — READMISSION MANAGEMENT (OUTPATIENT)
Dept: CALL CENTER | Facility: HOSPITAL | Age: 44
End: 2019-11-11

## 2019-11-11 NOTE — OUTREACH NOTE
AMI Week 1 Survey      Responses   Facility patient discharged from?  [place]   Does the patient have one of the following disease processes/diagnoses(primary or secondary)?  Acute MI (STEMI,NSTEMI)   Is there a successful TCM telephone encounter documented?  No   Week 1 attempt successful?  No   Unsuccessful attempts  Attempt 1          Zeenat Gardner RN

## 2019-11-12 NOTE — PROGRESS NOTES
Date of Office Visit: 2019  Encounter Provider: HOLGER Phillips  Place of Service: [facility] CARDIOLOGY  Patient Name: Nelda Mckeon  :1975    Chief Complaint   Patient presents with   • STEMI     1 WK HOSP FOLLOW UP   :     HPI: Nleda Mckeon is a 44 y.o. female who presents today for follow-up.  Old records have been obtained and reviewed by me.  She is a patient with a past medical history significant for diabetes, hypertension, obesity, and ongoing tobacco abuse.  On 2019, she presented to [facility] with substernal chest discomfort and shortness of breath.  An EKG showed an inferior STEMI and she was emergently transferred to [facility] via helicopter.  She was taken urgently for cardiac catheterization which revealed a 20% mid left main, 90% mid LAD, 40% proximal OM1, 90% proximal RCA, and 90% mid RCA.  She underwent successful angioplasty and drug-eluting stenting to the proximal RCA, mid RCA, and mid LAD.  She was started on dual antiplatelet therapy with aspirin and Effient.  Postprocedure echocardiogram revealed normal LV function and size with an estimated EF of 62% and no significant valvular abnormalities.  Her blood pressure was too low for beta-blockade.  She was seen by endocrinology for diabetes management.  On 2019, she was stable for discharge.  I am seeing her today for follow-up.   Over the past week, she has been doing well from cardiac standpoint.  She denies any chest pain, shortness of air, palpitations, edema, or syncope.  She denies any bleeding difficulties or melena.  She has occasional lightheadedness with quick position changes.  She fortunately has not smoked a cigarette since discharge.  She states that cardiac rehab at [facility] has been in contact with her.  However, they have told her she has to wait 1 month before beginning.  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  She has  "not yet returned to work.  She is curious when she can resume sexual activity.    Past Medical History:   Diagnosis Date   • Diabetes mellitus (CMS/HCC)    • Hypertension        Past Surgical History:   Procedure Laterality Date   • CARDIAC CATHETERIZATION N/A 11/5/2019    Procedure: Left Heart Cath;  Surgeon: Alejandro Espino MD;  Location:  [facility];  Service: Cardiovascular   • CARDIAC CATHETERIZATION N/A 11/5/2019    Procedure: Stent BILL coronary;  Surgeon: Alejandro Espino MD;  Location:  [facility];  Service: Cardiovascular   • CARDIAC CATHETERIZATION N/A 11/5/2019    Procedure: Left ventriculography;  Surgeon: Alejandro Espino MD;  Location:  [facility];  Service: Cardiovascular   • CARDIAC CATHETERIZATION N/A 11/5/2019    Procedure: Coronary angiography;  Surgeon: Alejandro Espino MD;  Location:  [facility];  Service: Cardiovascular   • CHOLECYSTECTOMY     • OK RT/LT HEART CATHETERS N/A 11/5/2019    Procedure: Percutaneous Coronary Intervention;  Surgeon: Alejandro Espino MD;  Location:  [facility];  Service: Cardiovascular       Social History     Socioeconomic History   • Marital status: Single     Spouse name: Not on file   • Number of children: Not on file   • Years of education: Not on file   • Highest education level: Not on file   Tobacco Use   • Smoking status: Former Smoker     Packs/day: 1.00     Years: 30.00     Pack years: 30.00     Types: Cigarettes   • Smokeless tobacco: Never Used   • Tobacco comment: CAFFEINE USE: 2 COKES DAILY   Substance and Sexual Activity   • Alcohol use: Yes     Comment: \"very seldom, maybe one drink every 5-6 months\" per pt   • Drug use: No   • Sexual activity: Defer       History reviewed. No pertinent family history.    Review of Systems   Constitution: Positive for malaise/fatigue. Negative for chills and fever.   Cardiovascular: Negative for chest pain, dyspnea on exertion, " "leg swelling, near-syncope, orthopnea, palpitations, paroxysmal nocturnal dyspnea and syncope.   Respiratory: Negative for cough and shortness of breath.    Musculoskeletal: Negative for joint pain, joint swelling and myalgias.   Gastrointestinal: Negative for abdominal pain, diarrhea, melena, nausea and vomiting.   Genitourinary: Negative for frequency and hematuria.   Neurological: Positive for light-headedness and numbness. Negative for paresthesias and seizures.   Allergic/Immunologic: Negative.    All other systems reviewed and are negative.      No Known Allergies      Current Outpatient Medications:   •  aspirin 81 MG EC tablet, Take 1 tablet by mouth Daily., Disp: 30 tablet, Rfl: 11  •  atorvastatin (LIPITOR) 40 MG tablet, Take 1 tablet by mouth Every Night., Disp: 30 tablet, Rfl: 11  •  buPROPion (WELLBUTRIN) 100 MG tablet, Take 150 mg by mouth 2 (Two) Times a Day., Disp: , Rfl:   •  glipizide (GLUCOTROL) 10 MG tablet, Take 10 mg by mouth 2 (Two) Times a Day Before Meals., Disp: , Rfl:   •  lisinopril (PRINIVIL,ZESTRIL) 10 MG tablet, Take 1 tablet by mouth Daily., Disp: 30 tablet, Rfl: 0  •  metFORMIN (GLUCOPHAGE) 500 MG tablet, Take 2 tablets by mouth 2 (Two) Times a Day With Meals., Disp: , Rfl:   •  prasugrel (EFFIENT) 10 MG tablet, Take 1 tablet by mouth Daily., Disp: 30 tablet, Rfl: 11  •  metoprolol tartrate (LOPRESSOR) 25 MG tablet, Take 0.5 tablets by mouth 2 (Two) Times a Day., Disp: 30 tablet, Rfl: 6      Objective:     Vitals:    11/13/19 1034 11/13/19 1043   BP: 120/78 120/78   BP Location: Right arm Left arm   Patient Position: Sitting Sitting   Pulse: 75    Resp: 18    SpO2: 98%    Weight: 84.2 kg (185 lb 9.6 oz)    Height: 160 cm (63\")      Body mass index is 32.88 kg/m².    PHYSICAL EXAM:    Physical Exam   Constitutional: She is oriented to person, place, and time. She appears well-developed and well-nourished. No distress.   HENT:   Head: Normocephalic and atraumatic.   Eyes: Pupils are " equal, round, and reactive to light.   Neck: No JVD present. No thyromegaly present.   Cardiovascular: Normal rate, regular rhythm, normal heart sounds and intact distal pulses.   No murmur heard.  Pulmonary/Chest: Effort normal and breath sounds normal. No respiratory distress.   Abdominal: Soft. Bowel sounds are normal. She exhibits no distension. There is no splenomegaly or hepatomegaly. There is no tenderness.   Musculoskeletal: Normal range of motion. She exhibits no edema.   Neurological: She is alert and oriented to person, place, and time.   Skin: Skin is warm and dry. She is not diaphoretic. No erythema.   Radial site well healed without erythema or edema. Proximal and distal pulses palpable. Good capillary refill.   Psychiatric: She has a normal mood and affect. Her behavior is normal. Judgment normal.         ECG 12 Lead  Date/Time: 11/13/2019 10:50 AM  Performed by: Conchita Zarate APRN  Authorized by: Conchita Zarate APRN   Comparison: compared with previous ECG from 11/6/2019  Similar to previous ECG  Rhythm: sinus rhythm  Rate: normal  BPM: 68  Q waves: III    T inversion: III and aVF    Clinical impression: abnormal EKG  Comments: Indication: CAD              Assessment:       Diagnosis Plan   1. Coronary artery disease involving native heart without angina pectoris, unspecified vessel or lesion type  ECG 12 Lead   2. STEMI involving right coronary artery (CMS/HCC)  ECG 12 Lead   3. Hyperlipidemia, unspecified hyperlipidemia type     4. Essential hypertension       Orders Placed This Encounter   Procedures   • ECG 12 Lead     This order was created via procedure documentation          Plan:       1.  Coronary artery disease.  She is status post stenting to the proximal and mid RCA as well as the mid LAD.  She remains on aspirin and Effient.  I am going to start her on low-dose metoprolol 12.5 mg daily.  I am not sure why cardiac rehab has told her she needs to wait to begin.  I have asked  her to call them to follow-up on this and let me know if she is unsuccessful.  She really needs to start within the week.  I encouraged her to wait until beginning cardiac rehab until resuming sexual activity.       2.  Hyperlipidemia.  She is on atorvastatin 40 mg daily and will need a repeat lipid panel and LFTs in 3 months.      3.  Hypertension.  Her blood pressure is stable today.  I am adding metoprolol 12.5 mg twice daily, which I think she will tolerate.      Overall I think she is doing well from a cardiac standpoint.  She denies any symptoms of angina or heart failure.  She is working on her risk factor modification and has fortunately abstain from smoking.  We do need to get her into cardiac rehab, which I am going to follow-up on.  She will follow-up with Dr. Espino on 12/10/2019 or sooner if needed.      As always, it has been a pleasure to participate in your patient's care.      Sincerely,         HOLGER Mitchell

## 2019-11-13 ENCOUNTER — OFFICE VISIT (OUTPATIENT)
Dept: CARDIOLOGY | Facility: CLINIC | Age: 44
End: 2019-11-13

## 2019-11-13 ENCOUNTER — READMISSION MANAGEMENT (OUTPATIENT)
Dept: CALL CENTER | Facility: HOSPITAL | Age: 44
End: 2019-11-13

## 2019-11-13 VITALS
WEIGHT: 185.6 LBS | HEART RATE: 75 BPM | BODY MASS INDEX: 32.89 KG/M2 | SYSTOLIC BLOOD PRESSURE: 120 MMHG | HEIGHT: 63 IN | DIASTOLIC BLOOD PRESSURE: 78 MMHG | RESPIRATION RATE: 18 BRPM | OXYGEN SATURATION: 98 %

## 2019-11-13 DIAGNOSIS — I21.11 STEMI INVOLVING RIGHT CORONARY ARTERY (HCC): ICD-10-CM

## 2019-11-13 DIAGNOSIS — E78.5 HYPERLIPIDEMIA, UNSPECIFIED HYPERLIPIDEMIA TYPE: ICD-10-CM

## 2019-11-13 DIAGNOSIS — I25.10 CORONARY ARTERY DISEASE INVOLVING NATIVE HEART WITHOUT ANGINA PECTORIS, UNSPECIFIED VESSEL OR LESION TYPE: Primary | ICD-10-CM

## 2019-11-13 DIAGNOSIS — I10 ESSENTIAL HYPERTENSION: ICD-10-CM

## 2019-11-13 PROCEDURE — 99214 OFFICE O/P EST MOD 30 MIN: CPT | Performed by: NURSE PRACTITIONER

## 2019-11-13 PROCEDURE — 93000 ELECTROCARDIOGRAM COMPLETE: CPT | Performed by: NURSE PRACTITIONER

## 2019-11-13 NOTE — OUTREACH NOTE
AMI Week 1 Survey      Responses   Facility patient discharged from?  [facility]   Does the patient have one of the following disease processes/diagnoses(primary or secondary)?  Acute MI (STEMI,NSTEMI)   Is there a successful TCM telephone encounter documented?  No   Week 1 attempt successful?  No   Unsuccessful attempts  Attempt 2          Filipe Berg RN

## 2019-11-14 ENCOUNTER — TELEPHONE (OUTPATIENT)
Dept: CARDIOLOGY | Facility: CLINIC | Age: 44
End: 2019-11-14

## 2019-11-14 NOTE — TELEPHONE ENCOUNTER
S/W Rivka w/ [facility] and she stated there she had called and L/M for pt to call her back re: rehab the other day. Rivka stated she would call pt again today, as she had pt's info sitting in front of her. We both verbalized appreciation and understanding. I also called and L/M for pt to give Rehab a call ASAP leaving the # [phone] and also advising she call me when scheduled to begin.    MANNIE Liu

## 2019-11-14 NOTE — TELEPHONE ENCOUNTER
----- Message from HOLGER Hawkins sent at 11/13/2019 11:18 AM EST -----  This patient is supposed to be starting cardiac rehab at [facility].  She reports they have contacted her but said she had to wait 1 month before beginning which is NOT true.  She needs to be enrolled and begin within the next week.  Please call them and find out what the hold up is.

## 2019-11-15 ENCOUNTER — READMISSION MANAGEMENT (OUTPATIENT)
Dept: CALL CENTER | Facility: HOSPITAL | Age: 44
End: 2019-11-15

## 2019-11-15 NOTE — OUTREACH NOTE
AMI Week 2 Survey      Responses   Facility patient discharged from?  [facility]   Does the patient have one of the following disease processes/diagnoses(primary or secondary)?  Acute MI (STEMI,NSTEMI)   Week 2 attempt successful?  No   Unsuccessful attempts  Attempt 1          Danisha Rojas RN

## 2019-11-18 ENCOUNTER — READMISSION MANAGEMENT (OUTPATIENT)
Dept: CALL CENTER | Facility: HOSPITAL | Age: 44
End: 2019-11-18

## 2019-11-18 NOTE — OUTREACH NOTE
AMI Week 2 Survey      Responses   Facility patient discharged from?  [facility]   Does the patient have one of the following disease processes/diagnoses(primary or secondary)?  Acute MI (STEMI,NSTEMI)   Week 2 attempt successful?  No   Unsuccessful attempts  Attempt 2          Danisha Rojas RN

## 2019-11-21 ENCOUNTER — HOSPITAL ENCOUNTER (OUTPATIENT)
Dept: CARDIAC REHAB | Facility: HOSPITAL | Age: 44
Setting detail: RECURRING SERIES
Discharge: HOME | End: 2020-03-03

## 2019-11-21 LAB — GLUCOSE BLD-MCNC: 143 MG/DL (ref 65–99)

## 2019-11-22 ENCOUNTER — TELEPHONE (OUTPATIENT)
Dept: CARDIOLOGY | Facility: CLINIC | Age: 44
End: 2019-11-22

## 2019-11-22 NOTE — TELEPHONE ENCOUNTER
Pt called. She has some upcoming dental work. She would like to know with her recent stents on 11/5/19 if this would be okay. They are also planning on doing xrays.  She can be reached at #[phone].  Please advise.    Thanks,  Siena

## 2019-11-22 NOTE — TELEPHONE ENCOUNTER
She does not need any antibiotics.  She needs to stay on her dual antiplatelet therapy even with the dental work.  X-rays that should be fine.  Please emphasize that she cannot stop the aspirin or the Effient.

## 2019-11-26 NOTE — TELEPHONE ENCOUNTER
I received an overhead call on pt. Dr. Meredith Moore office called. The pt is going to have a possible extraction or root canal for dental pain. They want to make sure this is okay or if she needs to wait until 6 months after her stents. Her stents were placed on 11/5/19.  Please advise.    Dr. Moore's office   P#[phone]    Thanks,  Siena

## 2019-11-27 LAB
GLUCOSE BLD-MCNC: 218 MG/DL (ref 65–99)
GLUCOSE BLD-MCNC: 235 MG/DL (ref 65–99)

## 2019-12-04 LAB
GLUCOSE BLD-MCNC: 211 MG/DL (ref 65–99)
GLUCOSE BLD-MCNC: 212 MG/DL (ref 65–99)

## 2019-12-05 RX ORDER — LISINOPRIL 10 MG/1
10 TABLET ORAL DAILY
Qty: 90 TABLET | Refills: 1 | Status: SHIPPED | OUTPATIENT
Start: 2019-12-05 | End: 2020-06-24

## 2019-12-06 LAB
GLUCOSE BLD-MCNC: 112 MG/DL (ref 65–99)
GLUCOSE BLD-MCNC: 126 MG/DL (ref 65–99)
GLUCOSE BLD-MCNC: 81 MG/DL (ref 65–99)

## 2019-12-09 LAB
GLUCOSE BLD-MCNC: 140 MG/DL (ref 65–99)
GLUCOSE BLD-MCNC: 99 MG/DL (ref 65–99)

## 2019-12-10 ENCOUNTER — OFFICE VISIT (OUTPATIENT)
Dept: CARDIOLOGY | Facility: CLINIC | Age: 44
End: 2019-12-10

## 2019-12-10 VITALS
DIASTOLIC BLOOD PRESSURE: 60 MMHG | SYSTOLIC BLOOD PRESSURE: 110 MMHG | HEART RATE: 60 BPM | HEIGHT: 64 IN | BODY MASS INDEX: 32.85 KG/M2 | WEIGHT: 192.4 LBS

## 2019-12-10 DIAGNOSIS — E78.5 HYPERLIPIDEMIA, UNSPECIFIED HYPERLIPIDEMIA TYPE: ICD-10-CM

## 2019-12-10 DIAGNOSIS — E11.65 POORLY CONTROLLED TYPE 2 DIABETES MELLITUS (HCC): ICD-10-CM

## 2019-12-10 DIAGNOSIS — Z72.0 TOBACCO ABUSE: ICD-10-CM

## 2019-12-10 DIAGNOSIS — I21.11 STEMI INVOLVING RIGHT CORONARY ARTERY (HCC): Primary | ICD-10-CM

## 2019-12-10 PROCEDURE — 93000 ELECTROCARDIOGRAM COMPLETE: CPT | Performed by: INTERNAL MEDICINE

## 2019-12-10 PROCEDURE — 99214 OFFICE O/P EST MOD 30 MIN: CPT | Performed by: INTERNAL MEDICINE

## 2019-12-10 NOTE — PROGRESS NOTES
Date of Office Visit: 12/10/19  Encounter Provider: Alejandro Espino MD  Place of Service: [facility] CARDIOLOGY  Patient Name: Nelda Mckeon  :1975  [id number]    Chief Complaint   Patient presents with   • Coronary Artery Disease   :     HPI: Nelda Mckeon is a 44 y.o. female she was a lady that had a stuttering inferior STEMI in 2019 she was chopper to appear from [facility].  She had 2 drug-eluting stents implanted to her RCA 1 distal RCA one in the proximal RCA but she also had disease in her mid LAD that was stented also LV function was normal she had a little bit of disease in her circumflex and an OM1 but it was not really obstructive.  Since that time she has stopped smoking.  She is in cardiac rehab and doing well she has not had any chest discomfort shortness of breath PND orthopnea edema.  Her only complaint is that she feels tired.  She has not been back to work.  She works cleaning houses    Past Medical History:   Diagnosis Date   • Diabetes mellitus (CMS/[facility])    • Hypertension        Past Surgical History:   Procedure Laterality Date   • CARDIAC CATHETERIZATION N/A 2019    Procedure: Left Heart Cath;  Surgeon: Alejandro Espino MD;  Location: [facility] CATH INVASIVE LOCATION;  Service: Cardiovascular   • CARDIAC CATHETERIZATION N/A 2019    Procedure: Stent BILL coronary;  Surgeon: Alejandro Espino MD;  Location: [facility] CATH INVASIVE LOCATION;  Service: Cardiovascular   • CARDIAC CATHETERIZATION N/A 2019    Procedure: Left ventriculography;  Surgeon: Alejandro Espino MD;  [facility] LOC[facility];  Service: Cardiovascular   • CARDIAC CATHETERIZATION N/A 2019    Procedure: Coronary angiography;  Surgeon: Alejandro Espino MD;  Location: [facility] CATH INVASIVE LOCATION;  Service: Cardiovascular   • CHOLECYSTECTOMY     • NM RT/LT HEART CATHETERS N/A 2019    Procedure: Percutaneous Coronary Intervention;  Surgeon: Srinivas  "MD Alejandro;  Location: [facility] LOCATION;  Service: Cardiovascular       Social History     Socioeconomic History   • Marital status: Single     Spouse name: Not on file   • Number of children: Not on file   • Years of education: Not on file   • Highest education level: Not on file   Tobacco Use   • Smoking status: Former Smoker     Packs/day: 1.00     Years: 30.00     Pack years: 30.00     Types: Cigarettes   • Smokeless tobacco: Never Used   • Tobacco comment: CAFFEINE USE: 2 COKES DAILY   Substance and Sexual Activity   • Alcohol use: Yes     Comment: \"very seldom, maybe one drink every 5-6 months\" per pt   • Drug use: No   • Sexual activity: Defer       No family history on file.    Review of Systems   Constitution: Negative for decreased appetite, fever, malaise/fatigue and weight loss.   HENT: Negative for nosebleeds.    Eyes: Negative for double vision.   Cardiovascular: Negative for chest pain, claudication, cyanosis, dyspnea on exertion, irregular heartbeat, leg swelling, near-syncope, orthopnea, palpitations, paroxysmal nocturnal dyspnea and syncope.   Respiratory: Negative for cough, hemoptysis and shortness of breath.    Hematologic/Lymphatic: Negative for bleeding problem.   Skin: Negative for rash.   Musculoskeletal: Negative for falls and myalgias.   Gastrointestinal: Negative for hematochezia, jaundice, melena, nausea and vomiting.   Genitourinary: Negative for hematuria.   Neurological: Negative for dizziness and seizures.   Psychiatric/Behavioral: Negative for altered mental status and memory loss.       No Known Allergies      Current Outpatient Medications:   •  atorvastatin (LIPITOR) 40 MG tablet, Take 1 tablet by mouth Every Night., Disp: 30 tablet, Rfl: 11  •  buPROPion (WELLBUTRIN) 100 MG tablet, Take 150 mg by mouth 2 (Two) Times a Day., Disp: , Rfl:   •  glipizide (GLUCOTROL) 10 MG tablet, Take 10 mg by mouth 2 (Two) Times a Day Before Meals., Disp: , Rfl:   •  lisinopril " "(PRINIVIL,ZESTRIL) 10 MG tablet, TAKE 1 TABLET BY MOUTH DAILY, Disp: 90 tablet, Rfl: 1  •  metFORMIN (GLUCOPHAGE) 500 MG tablet, Take 2 tablets by mouth 2 (Two) Times a Day With Meals., Disp: , Rfl:   •  prasugrel (EFFIENT) 10 MG tablet, Take 1 tablet by mouth Daily., Disp: 30 tablet, Rfl: 11      Objective:     Vitals:    12/10/19 1427   BP: 110/60   Pulse: 60   Weight: 87.3 kg (192 lb 6.4 oz)   Height: 162.6 cm (64\")     Body mass index is 33.03 kg/m².    Physical Exam   Constitutional: She is oriented to person, place, and time. She appears well-developed and well-nourished.   HENT:   Head: Normocephalic.   Eyes: No scleral icterus.   Neck: No JVD present. No thyromegaly present.   Cardiovascular: Normal rate, regular rhythm and normal heart sounds. Exam reveals no gallop and no friction rub.   No murmur heard.  Pulmonary/Chest: Effort normal and breath sounds normal. She has no wheezes. She has no rales.   Abdominal: Soft. There is no hepatosplenomegaly. There is no tenderness.   Musculoskeletal: Normal range of motion. She exhibits no edema.   Lymphadenopathy:     She has no cervical adenopathy.   Neurological: She is alert and oriented to person, place, and time.   Skin: Skin is warm and dry. No rash noted.   Psychiatric: She has a normal mood and affect.         ECG 12 Lead  Date/Time: 12/10/2019 2:57 PM  Performed by: Alejandro Espino MD  Authorized by: Alejandro Espino MD   Comparison: compared with previous ECG   Similar to previous ECG  Rhythm: sinus rhythm  Other findings: non-specific ST-T wave changes    Clinical impression: abnormal EKG             Assessment:       Diagnosis Plan   1. STEMI involving right coronary artery (CMS/HCC)     2. Hyperlipidemia, unspecified hyperlipidemia type     3. Poorly controlled type 2 diabetes mellitus (CMS/HCC)     4. Tobacco abuse            Plan:       I am really pleased with how she is doing I am ecstatic that she stop smoking.  We have to be super aggressive " with the risk factor modification is critical because she is such a young woman to have had an MI.  I believe she is trying as hard as she can.  I am going to make 2 changes today and when to stop her aspirin we will keep her on Effient for this first year then at the end of the year we will switch her to clopidogrel long-term.  I am also going to stop her metoprolol as possible is making her fatigued I am going to have her come back and see Conchita in a year and see me in 2 years    As always, it has been a pleasure to participate in your patient's care.      Sincerely,       Alejandro Espino MD

## 2019-12-11 LAB
GLUCOSE BLD-MCNC: 108 MG/DL (ref 65–99)
GLUCOSE BLD-MCNC: 188 MG/DL (ref 65–99)

## 2019-12-16 LAB
GLUCOSE BLD-MCNC: 104 MG/DL (ref 65–99)
GLUCOSE BLD-MCNC: 125 MG/DL (ref 65–99)
GLUCOSE BLD-MCNC: 97 MG/DL (ref 65–99)

## 2019-12-18 LAB
GLUCOSE BLD-MCNC: 105 MG/DL (ref 65–99)
GLUCOSE BLD-MCNC: 112 MG/DL (ref 65–99)
GLUCOSE BLD-MCNC: 120 MG/DL (ref 65–99)
GLUCOSE BLD-MCNC: 70 MG/DL (ref 65–99)
GLUCOSE BLD-MCNC: 75 MG/DL (ref 65–99)

## 2019-12-20 LAB
GLUCOSE BLD-MCNC: 139 MG/DL (ref 65–99)
GLUCOSE BLD-MCNC: 98 MG/DL (ref 65–99)

## 2019-12-23 LAB
GLUCOSE BLD-MCNC: 110 MG/DL (ref 65–99)
GLUCOSE BLD-MCNC: 138 MG/DL (ref 65–99)
GLUCOSE BLD-MCNC: 94 MG/DL (ref 65–99)

## 2019-12-27 LAB
GLUCOSE BLD-MCNC: 154 MG/DL (ref 65–99)
GLUCOSE BLD-MCNC: 250 MG/DL (ref 65–99)

## 2019-12-30 LAB
GLUCOSE BLD-MCNC: 103 MG/DL (ref 65–99)
GLUCOSE BLD-MCNC: 125 MG/DL (ref 65–99)

## 2020-01-03 LAB
GLUCOSE BLD-MCNC: 114 MG/DL (ref 65–99)
GLUCOSE BLD-MCNC: 124 MG/DL (ref 65–99)

## 2020-01-06 LAB
GLUCOSE BLD-MCNC: 108 MG/DL (ref 65–99)
GLUCOSE BLD-MCNC: 126 MG/DL (ref 65–99)

## 2020-01-08 LAB
GLUCOSE BLD-MCNC: 124 MG/DL (ref 65–99)
GLUCOSE BLD-MCNC: 136 MG/DL (ref 65–99)
GLUCOSE BLD-MCNC: 87 MG/DL (ref 65–99)

## 2020-01-10 LAB
GLUCOSE BLD-MCNC: 195 MG/DL (ref 65–99)
GLUCOSE BLD-MCNC: 78 MG/DL (ref 65–99)
GLUCOSE BLD-MCNC: 90 MG/DL (ref 65–99)

## 2020-01-13 LAB
GLUCOSE BLD-MCNC: 127 MG/DL (ref 65–99)
GLUCOSE BLD-MCNC: 159 MG/DL (ref 65–99)

## 2020-01-15 LAB
GLUCOSE BLD-MCNC: 128 MG/DL (ref 65–99)
GLUCOSE BLD-MCNC: 170 MG/DL (ref 65–99)

## 2020-01-17 LAB
GLUCOSE BLD-MCNC: 101 MG/DL (ref 65–99)
GLUCOSE BLD-MCNC: 178 MG/DL (ref 65–99)
GLUCOSE BLD-MCNC: 83 MG/DL (ref 65–99)
GLUCOSE BLD-MCNC: 83 MG/DL (ref 65–99)

## 2020-01-20 LAB
GLUCOSE BLD-MCNC: 100 MG/DL (ref 65–99)
GLUCOSE BLD-MCNC: 144 MG/DL (ref 65–99)

## 2020-01-22 LAB
GLUCOSE BLD-MCNC: 107 MG/DL (ref 65–99)
GLUCOSE BLD-MCNC: 118 MG/DL (ref 65–99)

## 2020-01-27 LAB
GLUCOSE BLD-MCNC: 118 MG/DL (ref 65–99)
GLUCOSE BLD-MCNC: 131 MG/DL (ref 65–99)
GLUCOSE BLD-MCNC: 84 MG/DL (ref 65–99)

## 2020-01-29 LAB
GLUCOSE BLD-MCNC: 136 MG/DL (ref 65–99)
GLUCOSE BLD-MCNC: 159 MG/DL (ref 65–99)

## 2020-01-31 LAB
GLUCOSE BLD-MCNC: 104 MG/DL (ref 65–99)
GLUCOSE BLD-MCNC: 186 MG/DL (ref 65–99)
GLUCOSE BLD-MCNC: 50 MG/DL (ref 65–99)
GLUCOSE BLD-MCNC: 50 MG/DL (ref 65–99)
GLUCOSE BLD-MCNC: 69 MG/DL (ref 65–99)
GLUCOSE BLD-MCNC: 83 MG/DL (ref 65–99)

## 2020-02-05 LAB
GLUCOSE BLD-MCNC: 109 MG/DL (ref 65–99)
GLUCOSE BLD-MCNC: 149 MG/DL (ref 65–99)

## 2020-02-07 LAB
GLUCOSE BLD-MCNC: 134 MG/DL (ref 65–99)
GLUCOSE BLD-MCNC: 154 MG/DL (ref 65–99)
GLUCOSE BLD-MCNC: 240 MG/DL (ref 65–99)

## 2020-02-10 LAB
GLUCOSE BLD-MCNC: 165 MG/DL (ref 65–99)
GLUCOSE BLD-MCNC: 95 MG/DL (ref 65–99)

## 2020-02-12 ENCOUNTER — TELEPHONE (OUTPATIENT)
Dept: CARDIOLOGY | Facility: CLINIC | Age: 45
End: 2020-02-12

## 2020-02-12 DIAGNOSIS — E78.5 HYPERLIPIDEMIA, UNSPECIFIED HYPERLIPIDEMIA TYPE: Primary | ICD-10-CM

## 2020-02-12 LAB
GLUCOSE BLD-MCNC: 110 MG/DL (ref 65–99)
GLUCOSE BLD-MCNC: 113 MG/DL (ref 65–99)
GLUCOSE BLD-MCNC: 97 MG/DL (ref 65–99)

## 2020-02-13 NOTE — TELEPHONE ENCOUNTER
L/M advising pt to get labs done. Advised pt to call office w/ any further questions or concerns.    MANNIE Liu

## 2020-02-14 ENCOUNTER — LAB (OUTPATIENT)
Dept: LAB | Facility: HOSPITAL | Age: 45
End: 2020-02-14

## 2020-02-14 DIAGNOSIS — E78.5 HYPERLIPIDEMIA, UNSPECIFIED HYPERLIPIDEMIA TYPE: ICD-10-CM

## 2020-02-14 LAB
ALBUMIN SERPL-MCNC: 4.5 G/DL (ref 3.5–5.2)
ALP SERPL-CCNC: 90 U/L (ref 39–117)
ALT SERPL W P-5'-P-CCNC: 19 U/L (ref 1–33)
AST SERPL-CCNC: 14 U/L (ref 1–32)
BILIRUB CONJ SERPL-MCNC: <0.2 MG/DL (ref 0.2–0.3)
BILIRUB INDIRECT SERPL-MCNC: ABNORMAL MG/DL
BILIRUB SERPL-MCNC: 0.4 MG/DL (ref 0.2–1.2)
CHOLEST SERPL-MCNC: 168 MG/DL (ref 0–200)
GLUCOSE BLD-MCNC: 116 MG/DL (ref 65–99)
GLUCOSE BLD-MCNC: 118 MG/DL (ref 65–99)
GLUCOSE BLD-MCNC: 133 MG/DL (ref 65–99)
GLUCOSE BLD-MCNC: 135 MG/DL (ref 65–99)
GLUCOSE BLD-MCNC: 146 MG/DL (ref 65–99)
HDLC SERPL-MCNC: 49 MG/DL (ref 40–60)
LDLC SERPL CALC-MCNC: 77 MG/DL (ref 0–100)
LDLC/HDLC SERPL: 1.56 {RATIO}
PROT SERPL-MCNC: 7 G/DL (ref 6–8.5)
TRIGL SERPL-MCNC: 212 MG/DL (ref 0–150)
VLDLC SERPL-MCNC: 42.4 MG/DL (ref 5–40)

## 2020-02-14 PROCEDURE — 80076 HEPATIC FUNCTION PANEL: CPT

## 2020-02-14 PROCEDURE — 80061 LIPID PANEL: CPT

## 2020-02-14 PROCEDURE — 36415 COLL VENOUS BLD VENIPUNCTURE: CPT

## 2020-02-17 ENCOUNTER — TELEPHONE (OUTPATIENT)
Dept: CARDIOLOGY | Facility: CLINIC | Age: 45
End: 2020-02-17

## 2020-02-17 LAB
GLUCOSE BLD-MCNC: 114 MG/DL (ref 65–99)
GLUCOSE BLD-MCNC: 128 MG/DL (ref 65–99)
GLUCOSE BLD-MCNC: 91 MG/DL (ref 65–99)

## 2020-02-17 NOTE — TELEPHONE ENCOUNTER
----- Message from HOLGER Hawkins sent at 2/17/2020  7:15 AM EST -----  Please let her know her labs are stable.

## 2020-02-17 NOTE — TELEPHONE ENCOUNTER
Advised pt of stable labs and that there would be no changes to her regimen at this time. Pt verbalized understanding.    MANNIE Liu

## 2020-02-19 LAB
GLUCOSE BLD-MCNC: 114 MG/DL (ref 65–99)
GLUCOSE BLD-MCNC: 114 MG/DL (ref 65–99)

## 2020-02-21 LAB
GLUCOSE BLD-MCNC: 112 MG/DL (ref 65–99)
GLUCOSE BLD-MCNC: 151 MG/DL (ref 65–99)

## 2020-02-24 LAB
GLUCOSE BLD-MCNC: 131 MG/DL (ref 65–99)
GLUCOSE BLD-MCNC: 142 MG/DL (ref 65–99)

## 2020-02-26 LAB
GLUCOSE BLD-MCNC: 116 MG/DL (ref 65–99)
GLUCOSE BLD-MCNC: 157 MG/DL (ref 65–99)

## 2020-02-28 LAB
GLUCOSE BLD-MCNC: 132 MG/DL (ref 65–99)
GLUCOSE BLD-MCNC: 143 MG/DL (ref 65–99)

## 2020-03-02 LAB
GLUCOSE BLD-MCNC: 109 MG/DL (ref 65–99)
GLUCOSE BLD-MCNC: 143 MG/DL (ref 65–99)
GLUCOSE BLD-MCNC: 88 MG/DL (ref 65–99)

## 2020-03-04 ENCOUNTER — HOSPITAL ENCOUNTER (OUTPATIENT)
Dept: OTHER | Facility: HOSPITAL | Age: 45
Setting detail: RECURRING SERIES
Discharge: HOME | End: 2020-03-04

## 2020-06-24 RX ORDER — LISINOPRIL 10 MG/1
10 TABLET ORAL DAILY
Qty: 90 TABLET | Refills: 1 | Status: SHIPPED | OUTPATIENT
Start: 2020-06-24 | End: 2021-08-03

## 2020-12-11 ENCOUNTER — TELEMEDICINE (OUTPATIENT)
Dept: CARDIOLOGY | Facility: CLINIC | Age: 45
End: 2020-12-11

## 2020-12-11 VITALS — HEIGHT: 64 IN | WEIGHT: 207 LBS | BODY MASS INDEX: 35.34 KG/M2

## 2020-12-11 DIAGNOSIS — E78.5 HYPERLIPIDEMIA, UNSPECIFIED HYPERLIPIDEMIA TYPE: ICD-10-CM

## 2020-12-11 DIAGNOSIS — I25.10 CORONARY ARTERY DISEASE INVOLVING NATIVE CORONARY ARTERY OF NATIVE HEART WITHOUT ANGINA PECTORIS: Primary | ICD-10-CM

## 2020-12-11 DIAGNOSIS — I10 ESSENTIAL HYPERTENSION: ICD-10-CM

## 2020-12-11 PROCEDURE — 99441 PR PHYS/QHP TELEPHONE EVALUATION 5-10 MIN: CPT | Performed by: NURSE PRACTITIONER

## 2020-12-11 RX ORDER — CLOPIDOGREL BISULFATE 75 MG/1
75 TABLET ORAL DAILY
Qty: 90 TABLET | Refills: 3 | Status: SHIPPED | OUTPATIENT
Start: 2020-12-11

## 2020-12-11 RX ORDER — ATORVASTATIN CALCIUM 40 MG/1
40 TABLET, FILM COATED ORAL NIGHTLY
Qty: 90 TABLET | Refills: 3 | Status: SHIPPED | OUTPATIENT
Start: 2020-12-11

## 2020-12-11 NOTE — PROGRESS NOTES
Date of Office Visit: 2020  Encounter Provider: HOLGER Phillips  Place of Service: [facility] CARDIOLOGY  Patient Name: Nelda Mckeon  :1975    Chief Complaint   Patient presents with   • Coronary Artery Disease     1 YR FOLLOW UP   :     HPI: Nelda Mckeon is a 45 y.o. female, known to me who presents today for follow-up.  Old records have been obtained and reviewed by me.  She is a patient of Dr. Espino's with a past cardiac history significant for hypertension and coronary artery disease.  In 2019, she had an inferior STEMI and underwent angioplasty and drug-eluting stenting of the proximal RCA, mid RCA, and mid LAD.  She had normal LV function.     She was last seen in the office by Dr. Espino on 12/10/2019 at which time she was doing well with no complaints of angina or heart failure.  Fortunately, she has stopped smoking.  Dr. Espino stopped her aspirin with plans to keep her on Effient for the first year and then switching her to Plavix.  Also stopped her metoprolol for planes of fatigue.  She was advised to follow-up in 1 year.   She has been doing well.  She denies any chest pain, shortness of breath, palpitations, edema, dizziness, or syncope.  She denies any bleeding difficulties or melena.  Fortunately she is still not smoking.  She has gained some weight during the pandemic which she is very aware of and working on.  She has been going to the gym 3 days a week prior to Covid.    Past Medical History:   Diagnosis Date   • Diabetes mellitus (CMS/HCC)    • Hypertension        Past Surgical History:   Procedure Laterality Date   • CARDIAC CATHETERIZATION N/A 2019    Procedure: Left Heart Cath;  Surgeon: Alejandro Espino MD;  Location: [facility] CATH INVASIVE LOCATION;  Service: Cardiovascular   • CARDIAC CATHETERIZATION N/A 2019    Procedure: Stent BILL coronary;  Surgeon: Alejandro Espnio MD;  Location: [facility] INVASIVE LOCATION;   "Service: Cardiovascular   • CARDIAC CATHETERIZATION N/A 11/5/2019    Procedure: Left ventriculography;  Surgeon: Alejandro Espino MD;  Location:  [facility] CATH INVASIVE LOCATION;  Service: Cardiovascular   • CARDIAC CATHETERIZATION N/A 11/5/2019    Procedure: Coronary angiography;  Surgeon: Alejandro Espino MD;  Location:  [facility] CATH INVASIVE LOCATION;  Service: Cardiovascular   • CHOLECYSTECTOMY     • [place] RT/LT HEART CATHETERS N/A 11/5/2019    Procedure: Percutaneous Coronary Intervention;  Surgeon: Alejandro Espino MD;  Location:  [facility] CATH INVASIVE LOCATION;  Service: Cardiovascular       Social History     Socioeconomic History   • Marital status: Single     Spouse name: Not on file   • Number of children: Not on file   • Years of education: Not on file   • Highest education level: Not on file   Tobacco Use   • Smoking status: Former Smoker     Packs/day: 1.00     Years: 30.00     Pack years: 30.00     Types: Cigarettes   • Smokeless tobacco: Never Used   • Tobacco comment: CAFFEINE USE: 4 COKE ZEROS DAILY   Substance and Sexual Activity   • Alcohol use: Yes     Comment: \"very seldom, maybe one drink every 5-6 months\" per pt   • Drug use: No   • Sexual activity: Defer       History reviewed. No pertinent family history.    Review of Systems   Constitution: Negative for chills, fever and malaise/fatigue.   Cardiovascular: Negative for chest pain, dyspnea on exertion, leg swelling, near-syncope, orthopnea, palpitations, paroxysmal nocturnal dyspnea and syncope.   Respiratory: Negative for cough and shortness of breath.    Musculoskeletal: Negative for joint pain, joint swelling and myalgias.   Gastrointestinal: Negative for abdominal pain, diarrhea, melena, nausea and vomiting.   Genitourinary: Negative for frequency and hematuria.   Neurological: Negative for light-headedness, numbness, paresthesias and seizures.   Allergic/Immunologic: Negative.    All other systems reviewed and are negative.      No Known " "Allergies      Current Outpatient Medications:   •  atorvastatin (LIPITOR) 40 MG tablet, Take 1 tablet by mouth Every Night., Disp: 90 tablet, Rfl: 3  •  buPROPion (WELLBUTRIN) 100 MG tablet, Take 150 mg by mouth 2 (Two) Times a Day., Disp: , Rfl:   •  glipizide (GLUCOTROL) 10 MG tablet, Take 10 mg by mouth 2 (Two) Times a Day Before Meals., Disp: , Rfl:   •  lisinopril (PRINIVIL,ZESTRIL) 10 MG tablet, TAKE 1 TABLET BY MOUTH DAILY, Disp: 90 tablet, Rfl: 1  •  metFORMIN (GLUCOPHAGE) 500 MG tablet, Take 2 tablets by mouth 2 (Two) Times a Day With Meals., Disp: , Rfl:   •  clopidogrel (PLAVIX) 75 MG tablet, Take 1 tablet by mouth Daily., Disp: 90 tablet, Rfl: 3      Objective:     Vitals:    12/11/20 1316   Weight: 93.9 kg (207 lb)   Height: 162.6 cm (64\")     Body mass index is 35.53 kg/m².          Assessment:       Diagnosis Plan   1. Coronary artery disease involving native coronary artery of native heart without angina pectoris     2. Essential hypertension     3. Hyperlipidemia, unspecified hyperlipidemia type       No orders of the defined types were placed in this encounter.         Plan:       1.  Coronary artery disease.  Status post inferior STEMI in November 2019.  She denies any symptoms of angina.  She remains on Effient, and I am going to transition her to Plavix.      2.  Hypertension.  Well-controlled.        3.  Hyperlipidemia.  She is on high intensity statin therapy with atorvastatin 40 mg daily.  Her last lipid panel was in February 2020 revealing an LDL of 77 HDL of 49.      I think she is stable and doing well.  She denies any symptoms of angina or heart failure.  She will follow-up with Dr. Espino in 1 year or sooner if needed.      This patient has consented to a telehealth visit via telephone. The visit was scheduled as a telephone visit to comply with patient safety concerns in accordance with CDC recommendations.  All vitals recorded within this visit are reported by the patient.  I spent 13 " minutes in total including but not limited to the 6 minutes spent in direct conversation with this patient.       As always, it has been a pleasure to participate in your patient's care.      Sincerely,         HOLGER Mitchell

## 2020-12-17 ENCOUNTER — TELEPHONE (OUTPATIENT)
Dept: CARDIOLOGY | Facility: CLINIC | Age: 45
End: 2020-12-17

## 2020-12-17 NOTE — TELEPHONE ENCOUNTER
"Conchita,    You spoke with Ms. Mckeon last Friday on a telehealth visit. Since that time, she has started to have \"really bad\" heartburn assosciated with chest pain that feels like \"gas bubble twinges.\"  She states she may have 4 of these twinges over a few minutes and they don't last.    We discussed her heartburn and she states she's always had a bad problem with it but when she had her MI last year, heartburn was her symptom. Because of this, she won't call her PCP until she hears from you.    She keeps saying she may be overreacting because she didn't have an EKG last Friday.             Do you have any recommendations?    Thank you,  Zeenat Glasgow RN  [facility]  Triage      "

## 2020-12-17 NOTE — TELEPHONE ENCOUNTER
I discussed your recommendations and she would like to not go to the ED. Instructed her to take whatever medications she usually takes for heartburn and to monitor how she feels.  She will go to ED if symptoms worsen or she becomes more concerned.  I will call her in the  Morning and follow up.    Thank you,  Zeenat Glasgow RN  [facility]  Triage

## 2020-12-17 NOTE — TELEPHONE ENCOUNTER
If she is having active chest pain, she needs to go to the ER.  If it is more just heartburn, I think it would be reasonable to see how she does overnight and follow-up with her tomorrow.  We can always bring her in for an EKG.

## 2020-12-18 NOTE — TELEPHONE ENCOUNTER
I spoke with the patient. She states that she was fine last night without any episodes of heartburn or chest pain. She did take rolaids yesterday after speaking with me.    Thank you,  Zeenat Glasgow RN  [facility]  Triage

## 2021-08-02 ENCOUNTER — HOSPITAL ENCOUNTER (INPATIENT)
Facility: HOSPITAL | Age: 46
LOS: 3 days | Discharge: HOME | End: 2021-08-06
Attending: EMERGENCY MEDICINE | Admitting: INTERNAL MEDICINE

## 2021-08-02 ENCOUNTER — APPOINTMENT (OUTPATIENT)
Dept: GENERAL RADIOLOGY | Facility: HOSPITAL | Age: 46
End: 2021-08-02

## 2021-08-02 DIAGNOSIS — U07.1 COVID-19 VIRUS INFECTION: ICD-10-CM

## 2021-08-02 DIAGNOSIS — I20.0 UNSTABLE ANGINA (HCC): ICD-10-CM

## 2021-08-02 DIAGNOSIS — R07.9 CHEST PAIN, UNSPECIFIED TYPE: Primary | ICD-10-CM

## 2021-08-02 DIAGNOSIS — E11.65 UNCONTROLLED TYPE 2 DIABETES MELLITUS WITH HYPERGLYCEMIA (HCC): ICD-10-CM

## 2021-08-02 LAB
ALBUMIN SERPL-MCNC: 4 G/DL (ref 3.5–5.2)
ALBUMIN/GLOB SERPL: 1.1 G/DL
ALP SERPL-CCNC: 75 U/L (ref 39–117)
ALT SERPL W P-5'-P-CCNC: 32 U/L (ref 1–33)
ANION GAP SERPL CALCULATED.3IONS-SCNC: 15.9 MMOL/L (ref 5–15)
AST SERPL-CCNC: 46 U/L (ref 1–32)
BASOPHILS # BLD AUTO: 0.01 10*3/MM3 (ref 0–0.2)
BASOPHILS NFR BLD AUTO: 0.1 % (ref 0–1.5)
BILIRUB SERPL-MCNC: 0.4 MG/DL (ref 0–1.2)
BUN SERPL-MCNC: 32 MG/DL (ref 6–20)
BUN/CREAT SERPL: 25 (ref 7–25)
CALCIUM SPEC-SCNC: 8.7 MG/DL (ref 8.6–10.5)
CHLORIDE SERPL-SCNC: 95 MMOL/L (ref 98–107)
CK MB SERPL-CCNC: <1 NG/ML
CK SERPL-CCNC: 38 U/L (ref 20–180)
CO2 SERPL-SCNC: 20.1 MMOL/L (ref 22–29)
CREAT SERPL-MCNC: 1.28 MG/DL (ref 0.57–1)
DEPRECATED RDW RBC AUTO: 40.1 FL (ref 37–54)
EOSINOPHIL # BLD AUTO: 0 10*3/MM3 (ref 0–0.4)
EOSINOPHIL NFR BLD AUTO: 0 % (ref 0.3–6.2)
ERYTHROCYTE [DISTWIDTH] IN BLOOD BY AUTOMATED COUNT: 12.8 % (ref 12.3–15.4)
GFR SERPL CREATININE-BSD FRML MDRD: 45 ML/MIN/1.73
GLOBULIN UR ELPH-MCNC: 3.7 GM/DL
GLUCOSE SERPL-MCNC: 229 MG/DL (ref 65–99)
HCT VFR BLD AUTO: 37.8 % (ref 34–46.6)
HGB BLD-MCNC: 13 G/DL (ref 12–15.9)
HOLD SPECIMEN: NORMAL
HOLD SPECIMEN: NORMAL
IMM GRANULOCYTES # BLD AUTO: 0.03 10*3/MM3 (ref 0–0.05)
IMM GRANULOCYTES NFR BLD AUTO: 0.4 % (ref 0–0.5)
LIPASE SERPL-CCNC: 62 U/L (ref 13–60)
LYMPHOCYTES # BLD AUTO: 0.78 10*3/MM3 (ref 0.7–3.1)
LYMPHOCYTES NFR BLD AUTO: 9.6 % (ref 19.6–45.3)
MAGNESIUM SERPL-MCNC: 1.6 MG/DL (ref 1.6–2.6)
MCH RBC QN AUTO: 29.4 PG (ref 26.6–33)
MCHC RBC AUTO-ENTMCNC: 34.4 G/DL (ref 31.5–35.7)
MCV RBC AUTO: 85.5 FL (ref 79–97)
MONOCYTES # BLD AUTO: 0.27 10*3/MM3 (ref 0.1–0.9)
MONOCYTES NFR BLD AUTO: 3.3 % (ref 5–12)
NEUTROPHILS NFR BLD AUTO: 7.02 10*3/MM3 (ref 1.7–7)
NEUTROPHILS NFR BLD AUTO: 86.6 % (ref 42.7–76)
NRBC BLD AUTO-RTO: 0 /100 WBC (ref 0–0.2)
NT-PROBNP SERPL-MCNC: 14.6 PG/ML (ref 0–450)
PLATELET # BLD AUTO: 147 10*3/MM3 (ref 140–450)
PMV BLD AUTO: 10.8 FL (ref 6–12)
POTASSIUM SERPL-SCNC: 3.5 MMOL/L (ref 3.5–5.2)
PROT SERPL-MCNC: 7.7 G/DL (ref 6–8.5)
RBC # BLD AUTO: 4.42 10*6/MM3 (ref 3.77–5.28)
SODIUM SERPL-SCNC: 131 MMOL/L (ref 136–145)
TROPONIN I SERPL-MCNC: 0 NG/ML (ref 0–0.6)
WBC # BLD AUTO: 8.11 10*3/MM3 (ref 3.4–10.8)
WHOLE BLOOD HOLD SPECIMEN: NORMAL

## 2021-08-02 PROCEDURE — 82550 ASSAY OF CK (CPK): CPT | Performed by: EMERGENCY MEDICINE

## 2021-08-02 PROCEDURE — 80053 COMPREHEN METABOLIC PANEL: CPT | Performed by: EMERGENCY MEDICINE

## 2021-08-02 PROCEDURE — 71045 X-RAY EXAM CHEST 1 VIEW: CPT

## 2021-08-02 PROCEDURE — 83690 ASSAY OF LIPASE: CPT | Performed by: EMERGENCY MEDICINE

## 2021-08-02 PROCEDURE — 85025 COMPLETE CBC W/AUTO DIFF WBC: CPT | Performed by: EMERGENCY MEDICINE

## 2021-08-02 PROCEDURE — 93005 ELECTROCARDIOGRAM TRACING: CPT | Performed by: EMERGENCY MEDICINE

## 2021-08-02 PROCEDURE — 82553 CREATINE MB FRACTION: CPT | Performed by: EMERGENCY MEDICINE

## 2021-08-02 PROCEDURE — 99284 EMERGENCY DEPT VISIT MOD MDM: CPT

## 2021-08-02 PROCEDURE — 84484 ASSAY OF TROPONIN QUANT: CPT | Performed by: INTERNAL MEDICINE

## 2021-08-02 PROCEDURE — 83880 ASSAY OF NATRIURETIC PEPTIDE: CPT | Performed by: EMERGENCY MEDICINE

## 2021-08-02 PROCEDURE — 83735 ASSAY OF MAGNESIUM: CPT | Performed by: EMERGENCY MEDICINE

## 2021-08-02 PROCEDURE — 84484 ASSAY OF TROPONIN QUANT: CPT

## 2021-08-02 RX ORDER — ASPIRIN 81 MG/1
324 TABLET, CHEWABLE ORAL ONCE
Status: COMPLETED | OUTPATIENT
Start: 2021-08-02 | End: 2021-08-02

## 2021-08-02 RX ORDER — SODIUM CHLORIDE 0.9 % (FLUSH) 0.9 %
10 SYRINGE (ML) INJECTION AS NEEDED
Status: DISCONTINUED | OUTPATIENT
Start: 2021-08-02 | End: 2021-08-06 | Stop reason: HOSPADM

## 2021-08-02 RX ADMIN — ASPIRIN 324 MG: 81 TABLET, CHEWABLE ORAL at 22:46

## 2021-08-03 PROBLEM — R07.9 CHEST PAIN: Status: ACTIVE | Noted: 2021-08-03

## 2021-08-03 LAB
ANION GAP SERPL CALCULATED.3IONS-SCNC: 14.3 MMOL/L (ref 5–15)
BUN SERPL-MCNC: 27 MG/DL (ref 6–20)
BUN/CREAT SERPL: 24.8 (ref 7–25)
CALCIUM SPEC-SCNC: 8.8 MG/DL (ref 8.6–10.5)
CHLORIDE SERPL-SCNC: 95 MMOL/L (ref 98–107)
CHOLEST SERPL-MCNC: 109 MG/DL (ref 0–200)
CO2 SERPL-SCNC: 23.7 MMOL/L (ref 22–29)
CREAT SERPL-MCNC: 1.09 MG/DL (ref 0.57–1)
D DIMER PPP FEU-MCNC: 0.65 MG/L (FEU) (ref 0–0.59)
DEPRECATED RDW RBC AUTO: 40.6 FL (ref 37–54)
ERYTHROCYTE [DISTWIDTH] IN BLOOD BY AUTOMATED COUNT: 13.2 % (ref 12.3–15.4)
GFR SERPL CREATININE-BSD FRML MDRD: 54 ML/MIN/1.73
GLUCOSE BLDC GLUCOMTR-MCNC: 120 MG/DL (ref 70–99)
GLUCOSE BLDC GLUCOMTR-MCNC: 167 MG/DL (ref 70–99)
GLUCOSE SERPL-MCNC: 122 MG/DL (ref 65–99)
HBA1C MFR BLD: 7.01 % (ref 4.8–5.6)
HCT VFR BLD AUTO: 38.3 % (ref 34–46.6)
HDLC SERPL-MCNC: 38 MG/DL (ref 40–60)
HGB BLD-MCNC: 13 G/DL (ref 12–15.9)
HOLD SPECIMEN: NORMAL
HOLD SPECIMEN: NORMAL
INR PPP: 0.86 (ref 2–3)
LDLC SERPL CALC-MCNC: 37 MG/DL (ref 0–100)
LDLC/HDLC SERPL: 0.73 {RATIO}
M PNEUMO IGM SER QL: NEGATIVE
MAGNESIUM SERPL-MCNC: 1.6 MG/DL (ref 1.6–2.6)
MCH RBC QN AUTO: 28.9 PG (ref 26.6–33)
MCHC RBC AUTO-ENTMCNC: 33.9 G/DL (ref 31.5–35.7)
MCV RBC AUTO: 85.1 FL (ref 79–97)
PLATELET # BLD AUTO: 151 10*3/MM3 (ref 140–450)
PMV BLD AUTO: 10.1 FL (ref 6–12)
POTASSIUM SERPL-SCNC: 3.2 MMOL/L (ref 3.5–5.2)
PROTHROMBIN TIME: 9.7 SECONDS (ref 9.4–12)
RBC # BLD AUTO: 4.5 10*6/MM3 (ref 3.77–5.28)
SARS-COV-2 RNA RESP QL NAA+PROBE: DETECTED
SODIUM SERPL-SCNC: 133 MMOL/L (ref 136–145)
TRIGL SERPL-MCNC: 217 MG/DL (ref 0–150)
TROPONIN T SERPL-MCNC: <0.01 NG/ML (ref 0–0.03)
VLDLC SERPL-MCNC: 34 MG/DL (ref 5–40)
WBC # BLD AUTO: 7.01 10*3/MM3 (ref 3.4–10.8)

## 2021-08-03 PROCEDURE — 86738 MYCOPLASMA ANTIBODY: CPT | Performed by: PHYSICIAN ASSISTANT

## 2021-08-03 PROCEDURE — 93005 ELECTROCARDIOGRAM TRACING: CPT

## 2021-08-03 PROCEDURE — 93005 ELECTROCARDIOGRAM TRACING: CPT | Performed by: EMERGENCY MEDICINE

## 2021-08-03 PROCEDURE — 87899 AGENT NOS ASSAY W/OPTIC: CPT | Performed by: PHYSICIAN ASSISTANT

## 2021-08-03 PROCEDURE — 83735 ASSAY OF MAGNESIUM: CPT | Performed by: PHYSICIAN ASSISTANT

## 2021-08-03 PROCEDURE — U0003 INFECTIOUS AGENT DETECTION BY NUCLEIC ACID (DNA OR RNA); SEVERE ACUTE RESPIRATORY SYNDROME CORONAVIRUS 2 (SARS-COV-2) (CORONAVIRUS DISEASE [COVID-19]), AMPLIFIED PROBE TECHNIQUE, MAKING USE OF HIGH THROUGHPUT TECHNOLOGIES AS DESCRIBED BY CMS-2020-01-R: HCPCS | Performed by: PHYSICIAN ASSISTANT

## 2021-08-03 PROCEDURE — 63710000001 INSULIN LISPRO (HUMAN) PER 5 UNITS: Performed by: PHYSICIAN ASSISTANT

## 2021-08-03 PROCEDURE — 25010000002 CEFTRIAXONE PER 250 MG: Performed by: PHYSICIAN ASSISTANT

## 2021-08-03 PROCEDURE — 85379 FIBRIN DEGRADATION QUANT: CPT | Performed by: PHYSICIAN ASSISTANT

## 2021-08-03 PROCEDURE — 93005 ELECTROCARDIOGRAM TRACING: CPT | Performed by: PHYSICIAN ASSISTANT

## 2021-08-03 PROCEDURE — 82962 GLUCOSE BLOOD TEST: CPT

## 2021-08-03 PROCEDURE — 25010000002 AZITHROMYCIN PER 500 MG: Performed by: PHYSICIAN ASSISTANT

## 2021-08-03 PROCEDURE — 85610 PROTHROMBIN TIME: CPT | Performed by: PHYSICIAN ASSISTANT

## 2021-08-03 PROCEDURE — 80048 BASIC METABOLIC PNL TOTAL CA: CPT | Performed by: PHYSICIAN ASSISTANT

## 2021-08-03 PROCEDURE — 80061 LIPID PANEL: CPT | Performed by: PHYSICIAN ASSISTANT

## 2021-08-03 PROCEDURE — 84484 ASSAY OF TROPONIN QUANT: CPT | Performed by: PHYSICIAN ASSISTANT

## 2021-08-03 PROCEDURE — 85027 COMPLETE CBC AUTOMATED: CPT | Performed by: PHYSICIAN ASSISTANT

## 2021-08-03 PROCEDURE — 83036 HEMOGLOBIN GLYCOSYLATED A1C: CPT | Performed by: PHYSICIAN ASSISTANT

## 2021-08-03 RX ORDER — DEXTROSE MONOHYDRATE 100 MG/ML
25 INJECTION, SOLUTION INTRAVENOUS
Status: DISCONTINUED | OUTPATIENT
Start: 2021-08-03 | End: 2021-08-06 | Stop reason: HOSPADM

## 2021-08-03 RX ORDER — PIOGLITAZONEHYDROCHLORIDE 45 MG/1
45 TABLET ORAL DAILY
COMMUNITY

## 2021-08-03 RX ORDER — ALBUTEROL SULFATE 2.5 MG/3ML
2.5 SOLUTION RESPIRATORY (INHALATION) ONCE AS NEEDED
Status: DISCONTINUED | OUTPATIENT
Start: 2021-08-03 | End: 2021-08-06 | Stop reason: HOSPADM

## 2021-08-03 RX ORDER — BUPROPION HYDROCHLORIDE 75 MG/1
150 TABLET ORAL EVERY 12 HOURS SCHEDULED
Status: DISCONTINUED | OUTPATIENT
Start: 2021-08-03 | End: 2021-08-06 | Stop reason: HOSPADM

## 2021-08-03 RX ORDER — LISINOPRIL AND HYDROCHLOROTHIAZIDE 20; 12.5 MG/1; MG/1
1 TABLET ORAL 2 TIMES DAILY
COMMUNITY

## 2021-08-03 RX ORDER — AMOXICILLIN 250 MG
2 CAPSULE ORAL 2 TIMES DAILY
Status: DISCONTINUED | OUTPATIENT
Start: 2021-08-03 | End: 2021-08-06 | Stop reason: HOSPADM

## 2021-08-03 RX ORDER — SODIUM CHLORIDE 0.9 % (FLUSH) 0.9 %
10 SYRINGE (ML) INJECTION EVERY 12 HOURS SCHEDULED
Status: DISCONTINUED | OUTPATIENT
Start: 2021-08-03 | End: 2021-08-06 | Stop reason: HOSPADM

## 2021-08-03 RX ORDER — NICOTINE POLACRILEX 4 MG
15 LOZENGE BUCCAL
Status: DISCONTINUED | OUTPATIENT
Start: 2021-08-03 | End: 2021-08-06 | Stop reason: HOSPADM

## 2021-08-03 RX ORDER — BISACODYL 10 MG
10 SUPPOSITORY, RECTAL RECTAL DAILY PRN
Status: DISCONTINUED | OUTPATIENT
Start: 2021-08-03 | End: 2021-08-06 | Stop reason: HOSPADM

## 2021-08-03 RX ORDER — SODIUM CHLORIDE FOR INHALATION 3 %
4 VIAL, NEBULIZER (ML) INHALATION ONCE AS NEEDED
Status: COMPLETED | OUTPATIENT
Start: 2021-08-03 | End: 2021-08-06

## 2021-08-03 RX ORDER — ALUMINA, MAGNESIA, AND SIMETHICONE 2400; 2400; 240 MG/30ML; MG/30ML; MG/30ML
15 SUSPENSION ORAL EVERY 6 HOURS PRN
Status: DISCONTINUED | OUTPATIENT
Start: 2021-08-03 | End: 2021-08-06 | Stop reason: HOSPADM

## 2021-08-03 RX ORDER — ACETAMINOPHEN 325 MG/1
650 TABLET ORAL EVERY 4 HOURS PRN
Status: DISCONTINUED | OUTPATIENT
Start: 2021-08-03 | End: 2021-08-06 | Stop reason: HOSPADM

## 2021-08-03 RX ORDER — BISACODYL 5 MG/1
5 TABLET, DELAYED RELEASE ORAL DAILY PRN
Status: DISCONTINUED | OUTPATIENT
Start: 2021-08-03 | End: 2021-08-06 | Stop reason: HOSPADM

## 2021-08-03 RX ORDER — ASPIRIN 81 MG/1
81 TABLET ORAL DAILY
Status: DISCONTINUED | OUTPATIENT
Start: 2021-08-04 | End: 2021-08-06 | Stop reason: HOSPADM

## 2021-08-03 RX ORDER — ATORVASTATIN CALCIUM 40 MG/1
40 TABLET, FILM COATED ORAL NIGHTLY
Status: DISCONTINUED | OUTPATIENT
Start: 2021-08-03 | End: 2021-08-06 | Stop reason: HOSPADM

## 2021-08-03 RX ORDER — IPRATROPIUM BROMIDE AND ALBUTEROL SULFATE 2.5; .5 MG/3ML; MG/3ML
3 SOLUTION RESPIRATORY (INHALATION) EVERY 4 HOURS PRN
Status: DISCONTINUED | OUTPATIENT
Start: 2021-08-03 | End: 2021-08-06 | Stop reason: HOSPADM

## 2021-08-03 RX ORDER — LISINOPRIL 10 MG/1
10 TABLET ORAL DAILY
Status: DISCONTINUED | OUTPATIENT
Start: 2021-08-03 | End: 2021-08-06 | Stop reason: HOSPADM

## 2021-08-03 RX ORDER — ONDANSETRON 2 MG/ML
4 INJECTION INTRAMUSCULAR; INTRAVENOUS EVERY 4 HOURS PRN
Status: DISCONTINUED | OUTPATIENT
Start: 2021-08-03 | End: 2021-08-06 | Stop reason: HOSPADM

## 2021-08-03 RX ORDER — NITROGLYCERIN 0.4 MG/1
0.4 TABLET SUBLINGUAL
Status: DISCONTINUED | OUTPATIENT
Start: 2021-08-03 | End: 2021-08-06 | Stop reason: HOSPADM

## 2021-08-03 RX ORDER — POLYETHYLENE GLYCOL 3350 17 G/17G
17 POWDER, FOR SOLUTION ORAL DAILY PRN
Status: DISCONTINUED | OUTPATIENT
Start: 2021-08-03 | End: 2021-08-06 | Stop reason: HOSPADM

## 2021-08-03 RX ORDER — SODIUM CHLORIDE 0.9 % (FLUSH) 0.9 %
10 SYRINGE (ML) INJECTION AS NEEDED
Status: DISCONTINUED | OUTPATIENT
Start: 2021-08-03 | End: 2021-08-06 | Stop reason: HOSPADM

## 2021-08-03 RX ORDER — CLOPIDOGREL BISULFATE 75 MG/1
75 TABLET ORAL DAILY
Status: DISCONTINUED | OUTPATIENT
Start: 2021-08-03 | End: 2021-08-06 | Stop reason: HOSPADM

## 2021-08-03 RX ADMIN — SODIUM CHLORIDE, PRESERVATIVE FREE 10 ML: 5 INJECTION INTRAVENOUS at 17:42

## 2021-08-03 RX ADMIN — CEFTRIAXONE 1 G: 10 INJECTION, POWDER, FOR SOLUTION INTRAVENOUS at 20:32

## 2021-08-03 RX ADMIN — ATORVASTATIN CALCIUM 40 MG: 40 TABLET, FILM COATED ORAL at 20:31

## 2021-08-03 RX ADMIN — SODIUM CHLORIDE, PRESERVATIVE FREE 10 ML: 5 INJECTION INTRAVENOUS at 20:37

## 2021-08-03 RX ADMIN — INSULIN LISPRO 2 UNITS: 100 INJECTION, SOLUTION INTRAVENOUS; SUBCUTANEOUS at 17:41

## 2021-08-03 RX ADMIN — AZITHROMYCIN 500 MG: 500 INJECTION, POWDER, LYOPHILIZED, FOR SOLUTION INTRAVENOUS at 05:08

## 2021-08-03 RX ADMIN — REMDESIVIR 200 MG: 100 INJECTION, POWDER, LYOPHILIZED, FOR SOLUTION INTRAVENOUS at 17:42

## 2021-08-03 RX ADMIN — BUPROPION HYDROCHLORIDE 150 MG: 75 TABLET, FILM COATED ORAL at 11:44

## 2021-08-03 RX ADMIN — BUPROPION HYDROCHLORIDE 150 MG: 75 TABLET, FILM COATED ORAL at 20:31

## 2021-08-03 RX ADMIN — ACETAMINOPHEN 650 MG: 325 TABLET ORAL at 20:31

## 2021-08-03 RX ADMIN — SODIUM CHLORIDE 1 G: 9 INJECTION INTRAMUSCULAR; INTRAVENOUS; SUBCUTANEOUS at 05:08

## 2021-08-03 RX ADMIN — CLOPIDOGREL BISULFATE 75 MG: 75 TABLET ORAL at 17:09

## 2021-08-03 RX ADMIN — AZITHROMYCIN 500 MG: 500 INJECTION, POWDER, LYOPHILIZED, FOR SOLUTION INTRAVENOUS at 20:32

## 2021-08-03 RX ADMIN — ATORVASTATIN CALCIUM 40 MG: 40 TABLET, FILM COATED ORAL at 05:07

## 2021-08-03 NOTE — H&P
[facility]   HOSPITALIST HISTORY AND PHYSICAL  Date: 8/3/2021   Patient Name: Nelda Mckeon  : 1975  MRN: [id number]  Primary Care Physician:  Dave Gonzales APRN  Date of admission: 2021    Subjective   Subjective     Chief Complaint: Chest pain    HPI:    Nelda Mckeon is a 46 y.o. female with history of diabetes mellitus, hypertension, hyperlipidemia, coronary artery disease status post stenting 2 years ago.  She presented to emergency department today due to chest discomfort.  Patient states that for the past 2 weeks she is felt poorly, more fatigued.  Denies any fevers.  She has had shortness of breath.  States that today she started vomiting and had a few episodes of loose stool.  She is not vaccinated against COVID-19.  She states the pressure in her chest is intermittent, she does not notice it worse with exertion.    In the emergency department, vitals were stable.  Laboratory evaluation revealed normal troponin, no acute changes on her EKG.  There is a left lower lobe pneumonia noted on her chest x-ray.  Because the above, the hospitalist team was contacted to admit for further management      Personal History     Past Medical History:  Past Medical History:   Diagnosis Date   • Diabetes mellitus (CMS/HCC)    • Hypertension    • Myocardial infarction (CMS/HCC)        Past Surgical History:  Past Surgical History:   Procedure Laterality Date   • CARDIAC CATHETERIZATION N/A 2019    Procedure: Left Heart Cath;  Surgeon: Alejandro Espino MD;  Location: [facility] INVASIVE LOCATION;  Service: Cardiovascular   • CARDIAC CATHETERIZATION N/A 2019    Procedure: Stent BILL coronary;  Surgeon: Alejandro Espino MD;  Location: [facility] CATH INVASIVE LOCATION;  Service: Cardiovascular   • CARDIAC CATHETERIZATION N/A 2019    Procedure: Left ventriculography;  Surgeon: Alejandro Espino MD;  Location: [facility] CATH INVASIVE LOCATION;  Service: Cardiovascular   • CARDIAC  CATHETERIZATION N/A 11/5/2019    Procedure: Coronary angiography;  Surgeon: Alejandro Espino MD;  Location:  [facility] CATH INVASIVE LOCATION;  Service: Cardiovascular   • CHOLECYSTECTOMY     • [place] RT/LT HEART CATHETERS N/A 11/5/2019    Procedure: Percutaneous Coronary Intervention;  Surgeon: Alejandro Espino MD;  Location:  [facility] CATH INVASIVE LOCATION;  Service: Cardiovascular       Family History:   History reviewed. No pertinent family history.    Social History:    reports that she has quit smoking. Her smoking use included cigarettes. She has a 30.00 pack-year smoking history. She has never used smokeless tobacco. She reports current alcohol use. She reports that she does not use drugs.    Home Medications:  atorvastatin, buPROPion, clopidogrel, glipizide, lisinopril, and metFORMIN    Allergies:  No Known Allergies    Review of Systems   All systems were reviewed and negative except for: Those listed in the HPI    Objective   Objective     Vitals:   Temp:  [98.5 °F (36.9 °C)] 98.5 °F (36.9 °C)  Heart Rate:  [75-90] 75  Resp:  [16] 16  BP: ()/(62-65) 105/62    Physical Exam    Constitutional: Awake, alert, no acute distress   Eyes: Pupils equal, sclerae anicteric, no conjunctival injection   HENT: NCAT, mucous membranes moist   Neck: Supple, no thyromegaly, no lymphadenopathy, trachea midline   Respiratory: Expiratory wheezes bilaterally, with crackles on the left, nonlabored respirations    Cardiovascular: RRR, no murmurs, rubs, or gallops, palpable pedal pulses bilaterally   Gastrointestinal: Positive bowel sounds, soft, nontender, nondistended   Musculoskeletal: No bilateral ankle edema, no clubbing or cyanosis to extremities   Psychiatric: Appropriate affect, cooperative   Neurologic: Oriented x 3, strength symmetric in all extremities, Cranial Nerves grossly intact to confrontation, speech clear   Skin: No rashes     Imaging:   XR Chest 1 View    Result Date: 8/2/2021  PROCEDURE: XR CHEST 1 VW   COMPARISON: [facility], , CHEST AP/PA 1 VIEW, 11/04/2019, 22:26.  INDICATIONS: CHEST PAIN, TODAY.  FINDINGS: A single AP upright portable chest radiograph was performed.  There is pulmonary hypoinflation.  There may be new atelectasis and/or infiltrate(s) in the left lung base.  Pneumonia cannot be excluded.  Artifact(s), such as related to low lung volumes and prominent pericardial fat, would be in the differential diagnosis, as well.  Probably no acute infiltrate is seen on the right.  No cardiac enlargement is suspected.  No pneumothorax is identified.  There may be postoperative changes of the right upper quadrant of the abdomen.  Chronic calcified granulomatous disease of the chest is suspected.  CONCLUSION: New atelectasis and/or infiltrate(s) in the left lung base is (are) possible.  There are low lung volumes.  Probably no cardiac enlargement is appreciated.      YOSEF NEGRO JR, MD       Electronically Signed and Approved By: YOSEF NEGRO JR, MD on 8/02/2021 at 23:36               Result Review    Result Review:  I have personally reviewed the results from the time of this admission to 8/3/2021 02:55 EDT and agree with these findings:  [x]  Laboratory  []  Microbiology  [x]  Radiology  []  EKG/Telemetry   []  Cardiology/Vascular   []  Pathology  []  Old records  []  Other:      Assessment/Plan   Assessment / Plan     Assessment:   Community-acquired pneumonia  Precordial chest pain  COVID-19 rule out  Type 2 diabetes mellitus  Essential hypertension  Hyperlipidemia  Coronary artery disease with remote MI status post stenting    Plan:  Admit to hospitalist service  Labs and imaging reviewed  We will defer to daytime team to consult cardiology should they see fit… I feel that patient symptoms are more related to her pneumonia rather than being cardiac in nature.  Obtain Covid swab  Obtain D-dimer  Placed on Rocephin and azithromycin  Obtain sputum culture, strep pneumo, Legionella,  mycoplasma    Patient's clinical course will dictate further management  Discussed with ED physician, RN    DVT prophylaxis:  No DVT prophylaxis order currently exists.    CODE STATUS:    Code Status: CPR  Medical Interventions (Level of Support Prior to Arrest): Full      Admission Status:  I believe this patient meets inpatient status.    Electronically signed by DIONY Murillo, 08/03/21, 2:55 AM EDT.

## 2021-08-03 NOTE — PLAN OF CARE
Goal Outcome Evaluation:      PT ADMITTED FROM ED PT REMAINED ON RA, COVID POSITIVE, NO OTHER COMPLAINTS AT THIS TIME

## 2021-08-03 NOTE — ED PROVIDER NOTES
Time: 10:28 PM EDT  Arrived by: private vehicle  Chief Complaint:   Chief Complaint   Patient presents with   • Chest Pain     History provided by: patient  History is limited by: N/A     History of Present Illness:  Patient is a 46 y.o. year old female that presents to the emergency department with chest pain, shortness of breath, and nausea that started this morning. Pt symptoms started with minimal exertion. Pt also notes weakness, vomiting and diarrhea. Pt notes the diarrhea has been black. Pt hasn't been on any antibiotics in the last 30 days.     Pt had an MI 2 years ago and they placed 3 stents. Pt had a stress test 2 years ago. Pt is on Plavix.       Chest Pain  Pain location:  L lateral chest  Pain quality: pressure    Pain radiates to:  Does not radiate  Pain severity:  Moderate  Onset quality:  Sudden  Duration:  1 day  Timing:  Constant  Progression:  Resolved  Chronicity:  New  Context comment:  Minimal exertion  Relieved by:  None tried  Worsened by:  Nothing  Ineffective treatments:  None tried  Associated symptoms: diaphoresis, nausea, shortness of breath, vomiting and weakness    Associated symptoms: no back pain, no cough and no fever    Nausea:     Severity:  Mild    Onset quality:  Sudden    Duration:  1 day    Timing:  Constant    Progression:  Resolved  Shortness of breath:     Severity:  Mild    Onset quality:  Sudden    Duration:  1 day    Timing:  Constant    Progression:  Resolved  Risk factors: diabetes mellitus, high cholesterol and hypertension    Risk factors: no smoking        Similar Symptoms Previously: no  Recently seen: no      Patient Care Team  Primary Care Provider: Dave Gonzales APRN  Cardiologist: Srinivas    Past Medical History:     No Known Allergies  Past Medical History:   Diagnosis Date   • Diabetes mellitus (CMS/HCC)    • Hypertension    • Myocardial infarction (CMS/HCC)      Past Surgical History:   Procedure Laterality Date   • CARDIAC CATHETERIZATION N/A 11/5/2019     Procedure: Left Heart Cath;  Surgeon: Alejandro Espino MD;  Location:  [facility];  Service: Cardiovascular   • CARDIAC CATHETERIZATION N/A 11/5/2019    Procedure: Stent BILL coronary;  Surgeon: Alejandro Espino MD;  Location:  [facility];  Service: Cardiovascular   • CARDIAC CATHETERIZATION N/A 11/5/2019    Procedure: Left ventriculography;  Surgeon: Alejandro Espino MD;  Location:  [facility];  Service: Cardiovascular   • CARDIAC CATHETERIZATION N/A 11/5/2019    Procedure: Coronary angiography;  Surgeon: Alejandro Espino MD;  Location:  [facility];  Service: Cardiovascular   • CHOLECYSTECTOMY     • OH RT/LT HEART CATHETERS N/A 11/5/2019    Procedure: Percutaneous Coronary Intervention;  Surgeon: Alejandro Espino MD;  Location:  [facility];  Service: Cardiovascular     History reviewed. No pertinent family history.    Home Medications:  Prior to Admission medications    Medication Sig Start Date End Date Taking? Authorizing Provider   atorvastatin (LIPITOR) 40 MG tablet Take 1 tablet by mouth Every Night. 12/11/20   Conchita Zarate APRN   buPROPion (WELLBUTRIN) 100 MG tablet Take 150 mg by mouth 2 (Two) Times a Day.    ProviderElin MD   clopidogrel (PLAVIX) 75 MG tablet Take 1 tablet by mouth Daily. 12/11/20   Conchita Zarate APRN   glipizide (GLUCOTROL) 10 MG tablet Take 10 mg by mouth 2 (Two) Times a Day Before Meals.    ProviderElin MD   lisinopril (PRINIVIL,ZESTRIL) 10 MG tablet TAKE 1 TABLET BY MOUTH DAILY 6/24/20   Conchita Zarate APRN   metFORMIN (GLUCOPHAGE) 500 MG tablet Take 2 tablets by mouth 2 (Two) Times a Day With Meals. 11/8/19   Conchita Zarate APRN        Social History:   Social History     Tobacco Use   • Smoking status: Former Smoker     Packs/day: 1.00     Years: 30.00     Pack years: 30.00     Types: Cigarettes   • Smokeless tobacco: Never Used   • Tobacco comment:  "CAFFEINE USE: 4 COKE ZEROS DAILY   Vaping Use   • Vaping Use: Never used   Substance Use Topics   • Alcohol use: Yes     Comment: \"very seldom, maybe one drink every 5-6 months\" per pt   • Drug use: No         Review of Systems:  Review of Systems   Constitutional: Positive for diaphoresis. Negative for chills and fever.   HENT: Negative for nosebleeds.    Eyes: Negative for redness.   Respiratory: Positive for shortness of breath. Negative for cough.    Cardiovascular: Positive for chest pain.   Gastrointestinal: Positive for diarrhea, nausea and vomiting.   Genitourinary: Negative for dysuria and frequency.   Musculoskeletal: Negative for back pain and neck pain.   Skin: Negative for rash.   Neurological: Positive for weakness. Negative for seizures and syncope.        Physical Exam:  /62 (BP Location: Right arm, Patient Position: Lying)   Pulse 75   Temp 98.5 °F (36.9 °C) (Oral)   Resp 16   Ht 162.6 cm (64\")   Wt 92.4 kg (203 lb 11.3 oz)   LMP  (LMP Unknown)   SpO2 95%   BMI 34.97 kg/m²     Physical Exam  Vitals and nursing note reviewed.   Constitutional:       General: She is not in acute distress.     Appearance: Normal appearance. She is not ill-appearing.   HENT:      Head: Normocephalic and atraumatic.      Nose: Nose normal.      Mouth/Throat:      Pharynx: Oropharynx is clear.   Eyes:      General: Lids are normal. No scleral icterus.     Conjunctiva/sclera: Conjunctivae normal.      Pupils: Pupils are equal, round, and reactive to light.   Cardiovascular:      Rate and Rhythm: Normal rate and regular rhythm.      Pulses: Normal pulses.      Heart sounds: Normal heart sounds. No murmur heard.     Pulmonary:      Effort: Pulmonary effort is normal. No respiratory distress.      Breath sounds: Examination of the right-lower field reveals rales. Examination of the left-lower field reveals rales. Decreased breath sounds, wheezing (faint) and rales (greater on left) present. No rhonchi.   Chest: "      Chest wall: No tenderness.   Abdominal:      Palpations: Abdomen is soft.      Tenderness: There is no abdominal tenderness. There is no guarding or rebound.      Comments: No rigidity.   Musculoskeletal:         General: No tenderness. Normal range of motion.      Cervical back: Normal range of motion and neck supple. No tenderness.      Right lower leg: No edema.      Left lower leg: No edema.      Comments: Calves are non-tender.    Skin:     General: Skin is warm and dry.      Findings: No rash.   Neurological:      General: No focal deficit present.      Mental Status: She is alert and oriented to person, place, and time.      Sensory: Sensation is intact. No sensory deficit.      Motor: Motor function is intact. No weakness.   Psychiatric:         Mood and Affect: Mood normal.         Behavior: Behavior normal.                Medications in the Emergency Department:  Medications   sodium chloride 0.9 % flush 10 mL (has no administration in time range)   aspirin chewable tablet 324 mg (324 mg Oral Given 8/2/21 2246)        Labs  Lab Results (last 24 hours)     Procedure Component Value Units Date/Time    POC Troponin I with Hold Tube [[id number]] Collected: 08/02/21 2239    Specimen: Blood Updated: 08/02/21 2251    Narrative:      The following orders were created for panel order POC Troponin I with Hold Tube.  Procedure                               Abnormality         Status                     ---------                               -----------         ------                     POC Troponin I[[id number]]                                                              HOLD Troponin-I Tube[[id number]]                             In process                   Please view results for these tests on the individual orders.    CBC & Differential [[id number]]  (Abnormal) Collected: 08/02/21 2239    Specimen: Blood Updated: 08/02/21 2247    Narrative:      The following orders were created for panel order CBC &  Differential.  Procedure                               Abnormality         Status                     ---------                               -----------         ------                     CBC Auto Differential[[id number]]        Abnormal            Final result                 Please view results for these tests on the individual orders.    Comprehensive Metabolic Panel [[id number]]  (Abnormal) Collected: 08/02/21 2239    Specimen: Blood Updated: 08/02/21 2311     Glucose 229 mg/dL      BUN 32 mg/dL      Creatinine 1.28 mg/dL      Sodium 131 mmol/L      Potassium 3.5 mmol/L      Chloride 95 mmol/L      CO2 20.1 mmol/L      Calcium 8.7 mg/dL      Total Protein 7.7 g/dL      Albumin 4.00 g/dL      ALT (SGPT) 32 U/L      AST (SGOT) 46 U/L      Alkaline Phosphatase 75 U/L      Total Bilirubin 0.4 mg/dL      eGFR Non African Amer 45 mL/min/1.73      Globulin 3.7 gm/dL      A/G Ratio 1.1 g/dL      BUN/Creatinine Ratio 25.0     Anion Gap 15.9 mmol/L     Narrative:      GFR Normal >60  Chronic Kidney Disease <60  Kidney Failure <15      Lipase [[id number]]  (Abnormal) Collected: 08/02/21 2239    Specimen: Blood Updated: 08/02/21 2311     Lipase 62 U/L     BNP [[id number]]  (Normal) Collected: 08/02/21 2239    Specimen: Blood Updated: 08/02/21 2309     proBNP 14.6 pg/mL     Narrative:      Among patients with dyspnea, NT-proBNP is highly sensitive for the detection of acute congestive heart failure. In addition NT-proBNP of <300 pg/ml effectively rules out acute congestive heart failure with 99% negative predictive value.    Results may be falsely decreased if patient taking Biotin.      Magnesium [[id number]]  (Normal) Collected: 08/02/21 2239    Specimen: Blood Updated: 08/02/21 2311     Magnesium 1.6 mg/dL     CK Total & CKMB [[id number]]  (Normal) Collected: 08/02/21 2239    Specimen: Blood Updated: 08/02/21 2325     CKMB <1.00 ng/mL      Creatine Kinase 38 U/L     Narrative:      CKMB results may be falsely decreased if  patient taking Biotin.    CBC Auto Differential [[id number]]  (Abnormal) Collected: 08/02/21 2239    Specimen: Blood Updated: 08/02/21 2247     WBC 8.11 10*3/mm3      RBC 4.42 10*6/mm3      Hemoglobin 13.0 g/dL      Hematocrit 37.8 %      MCV 85.5 fL      MCH 29.4 pg      MCHC 34.4 g/dL      RDW 12.8 %      RDW-SD 40.1 fl      MPV 10.8 fL      Platelets 147 10*3/mm3      Neutrophil % 86.6 %      Lymphocyte % 9.6 %      Monocyte % 3.3 %      Eosinophil % 0.0 %      Basophil % 0.1 %      Immature Grans % 0.4 %      Neutrophils, Absolute 7.02 10*3/mm3      Lymphocytes, Absolute 0.78 10*3/mm3      Monocytes, Absolute 0.27 10*3/mm3      Eosinophils, Absolute 0.00 10*3/mm3      Basophils, Absolute 0.01 10*3/mm3      Immature Grans, Absolute 0.03 10*3/mm3      nRBC 0.0 /100 WBC     POC Troponin I [[id number]]  (Normal) Collected: 08/02/21 2240    Specimen: Blood Updated: 08/02/21 2251     Troponin I 0.00 ng/mL      Comment: Serial Number: [id number]:  [id number]       POC Troponin I with Hold Tube [[id number]] Collected: 08/03/21 0005    Specimen: Blood Updated: 08/03/21 0012    Narrative:      The following orders were created for panel order POC Troponin I with Hold Tube.  Procedure                               Abnormality         Status                     ---------                               -----------         ------                     POC Troponin I[[id number]]                                                              HOLD Troponin-I Tube[[id number]]                             In process                   Please view results for these tests on the individual orders.           Imaging:  XR Chest 1 View    Result Date: 8/2/2021  PROCEDURE: XR CHEST 1 VW  COMPARISON: [facility], , CHEST AP/PA 1 VIEW, 11/04/2019, 22:26.  INDICATIONS: CHEST PAIN, TODAY.  FINDINGS: A single AP upright portable chest radiograph was performed.  There is pulmonary hypoinflation.  There may be new atelectasis and/or  infiltrate(s) in the left lung base.  Pneumonia cannot be excluded.  Artifact(s), such as related to low lung volumes and prominent pericardial fat, would be in the differential diagnosis, as well.  Probably no acute infiltrate is seen on the right.  No cardiac enlargement is suspected.  No pneumothorax is identified.  There may be postoperative changes of the right upper quadrant of the abdomen.  Chronic calcified granulomatous disease of the chest is suspected.  CONCLUSION: New atelectasis and/or infiltrate(s) in the left lung base is (are) possible.  There are low lung volumes.  Probably no cardiac enlargement is appreciated.      YOSEF NEGRO JR, MD       Electronically Signed and Approved By: YOSEF NEGRO JR, MD on 8/02/2021 at 23:36               Procedures:  Procedures    Progress  ED Course as of Aug 03 0235   Tue Aug 03, 2021   0212 EKG:    Rhythm: Sinus rhythm  Rate: 86  Intervals: Normal NE and QT interval  T-wave: Nonspecific T wave flattening in II, III, aVF, V5, V6  ST Segment: There is no obvious ST elevation or ST depression    EKG Comparison: Unavailable    Interpreted by me        [SD]      ED Course User Index  [SD] Gee Colorado DO                         HEART Score (for prediction of 6-week risk of major adverse cardiac event) reviewed and/or performed as part of the patient evaluation and treatment planning process.  The result associated with this review/performance is: 4        Medical Decision Making:  MDM  Number of Diagnoses or Management Options  Chest pain, unspecified type  Uncontrolled type 2 diabetes mellitus with hyperglycemia (CMS/HCC)  Unstable angina (CMS/HCC)  Diagnosis management comments: The patient is resting comfortably at this time.  The patient has a moderate heart score.  The patient understands that she has moderate risk for coronary artery event over the next 6 weeks.  The patient has a history of coronary disease with 3 previous stents 2 years ago.  The patient  had her cardiac [facility].  [facility] due to her moderate risk.  She was subsequently admitted to the hospital for chest pain with possible unstable angina       Amount and/or Complexity of Data Reviewed  Clinical lab tests: reviewed  Tests in the radiology section of CPT®: reviewed  Tests in the medicine section of CPT®: reviewed         Final diagnoses:   Chest pain, unspecified type   Unstable angina (CMS/HCC)   Uncontrolled type 2 diabetes mellitus with hyperglycemia (CMS/[facility])        Disposition:  ED Disposition     ED Disposition Condition Comment    Decision to Admit            Documentation assistance provided by Ni Irving acting as scribe for Gee Colorado DO. Information recorded by the scribe was done at my direction and has been verified and validated by me.          Ni Irving  08/02/21 5426       Ni Irving  08/02/21 3369       Gee Colorado DO  08/04/21 0143

## 2021-08-04 LAB
ALBUMIN SERPL-MCNC: 3.2 G/DL (ref 3.5–5.2)
ALBUMIN/GLOB SERPL: 1 G/DL
ALP SERPL-CCNC: 67 U/L (ref 39–117)
ALT SERPL W P-5'-P-CCNC: 20 U/L (ref 1–33)
ANION GAP SERPL CALCULATED.3IONS-SCNC: 11.8 MMOL/L (ref 5–15)
AST SERPL-CCNC: 24 U/L (ref 1–32)
BASOPHILS # BLD AUTO: 0 10*3/MM3 (ref 0–0.2)
BASOPHILS NFR BLD AUTO: 0 % (ref 0–1.5)
BILIRUB SERPL-MCNC: 0.3 MG/DL (ref 0–1.2)
BUN SERPL-MCNC: 20 MG/DL (ref 6–20)
BUN/CREAT SERPL: 20.4 (ref 7–25)
CALCIUM SPEC-SCNC: 8.4 MG/DL (ref 8.6–10.5)
CHLORIDE SERPL-SCNC: 96 MMOL/L (ref 98–107)
CO2 SERPL-SCNC: 21.2 MMOL/L (ref 22–29)
CREAT SERPL-MCNC: 0.98 MG/DL (ref 0.57–1)
DEPRECATED RDW RBC AUTO: 39.7 FL (ref 37–54)
EOSINOPHIL # BLD AUTO: 0.01 10*3/MM3 (ref 0–0.4)
EOSINOPHIL NFR BLD AUTO: 0.2 % (ref 0.3–6.2)
ERYTHROCYTE [DISTWIDTH] IN BLOOD BY AUTOMATED COUNT: 12.9 % (ref 12.3–15.4)
GFR SERPL CREATININE-BSD FRML MDRD: 61 ML/MIN/1.73
GLOBULIN UR ELPH-MCNC: 3.1 GM/DL
GLUCOSE BLDC GLUCOMTR-MCNC: 137 MG/DL (ref 70–99)
GLUCOSE BLDC GLUCOMTR-MCNC: 189 MG/DL (ref 70–99)
GLUCOSE BLDC GLUCOMTR-MCNC: 190 MG/DL (ref 70–99)
GLUCOSE SERPL-MCNC: 262 MG/DL (ref 65–99)
HCT VFR BLD AUTO: 32.9 % (ref 34–46.6)
HGB BLD-MCNC: 11.2 G/DL (ref 12–15.9)
IMM GRANULOCYTES # BLD AUTO: 0.03 10*3/MM3 (ref 0–0.05)
IMM GRANULOCYTES NFR BLD AUTO: 0.6 % (ref 0–0.5)
L PNEUMO1 AG UR QL IA: NEGATIVE
LYMPHOCYTES # BLD AUTO: 1.08 10*3/MM3 (ref 0.7–3.1)
LYMPHOCYTES NFR BLD AUTO: 22.3 % (ref 19.6–45.3)
MCH RBC QN AUTO: 28.7 PG (ref 26.6–33)
MCHC RBC AUTO-ENTMCNC: 34 G/DL (ref 31.5–35.7)
MCV RBC AUTO: 84.4 FL (ref 79–97)
MONOCYTES # BLD AUTO: 0.18 10*3/MM3 (ref 0.1–0.9)
MONOCYTES NFR BLD AUTO: 3.7 % (ref 5–12)
NEUTROPHILS NFR BLD AUTO: 3.55 10*3/MM3 (ref 1.7–7)
NEUTROPHILS NFR BLD AUTO: 73.2 % (ref 42.7–76)
NRBC BLD AUTO-RTO: 0 /100 WBC (ref 0–0.2)
PHOSPHATE SERPL-MCNC: 2.2 MG/DL (ref 2.5–4.5)
PLATELET # BLD AUTO: 142 10*3/MM3 (ref 140–450)
PMV BLD AUTO: 10.2 FL (ref 6–12)
POTASSIUM SERPL-SCNC: 3.1 MMOL/L (ref 3.5–5.2)
PROT SERPL-MCNC: 6.3 G/DL (ref 6–8.5)
RBC # BLD AUTO: 3.9 10*6/MM3 (ref 3.77–5.28)
S PNEUM AG SPEC QL LA: NEGATIVE
SODIUM SERPL-SCNC: 129 MMOL/L (ref 136–145)
WBC # BLD AUTO: 4.85 10*3/MM3 (ref 3.4–10.8)

## 2021-08-04 PROCEDURE — 25010000002 CEFTRIAXONE PER 250 MG: Performed by: PHYSICIAN ASSISTANT

## 2021-08-04 PROCEDURE — 82962 GLUCOSE BLOOD TEST: CPT

## 2021-08-04 PROCEDURE — 85025 COMPLETE CBC W/AUTO DIFF WBC: CPT | Performed by: INTERNAL MEDICINE

## 2021-08-04 PROCEDURE — 63710000001 INSULIN LISPRO (HUMAN) PER 5 UNITS: Performed by: PHYSICIAN ASSISTANT

## 2021-08-04 PROCEDURE — 99233 SBSQ HOSP IP/OBS HIGH 50: CPT | Performed by: INTERNAL MEDICINE

## 2021-08-04 PROCEDURE — 84100 ASSAY OF PHOSPHORUS: CPT | Performed by: INTERNAL MEDICINE

## 2021-08-04 PROCEDURE — 63710000001 DIPHENHYDRAMINE PER 50 MG: Performed by: INTERNAL MEDICINE

## 2021-08-04 PROCEDURE — 80053 COMPREHEN METABOLIC PANEL: CPT | Performed by: INTERNAL MEDICINE

## 2021-08-04 RX ORDER — AZITHROMYCIN 250 MG/1
500 TABLET, FILM COATED ORAL
Status: COMPLETED | OUTPATIENT
Start: 2021-08-04 | End: 2021-08-06

## 2021-08-04 RX ORDER — DIPHENHYDRAMINE HCL 25 MG
25 CAPSULE ORAL EVERY 6 HOURS PRN
Status: DISCONTINUED | OUTPATIENT
Start: 2021-08-04 | End: 2021-08-06 | Stop reason: HOSPADM

## 2021-08-04 RX ADMIN — ACETAMINOPHEN 650 MG: 325 TABLET ORAL at 22:33

## 2021-08-04 RX ADMIN — CEFTRIAXONE 1 G: 10 INJECTION, POWDER, FOR SOLUTION INTRAVENOUS at 22:15

## 2021-08-04 RX ADMIN — INSULIN LISPRO 2 UNITS: 100 INJECTION, SOLUTION INTRAVENOUS; SUBCUTANEOUS at 17:37

## 2021-08-04 RX ADMIN — REMDESIVIR 100 MG: 100 INJECTION, POWDER, LYOPHILIZED, FOR SOLUTION INTRAVENOUS at 14:23

## 2021-08-04 RX ADMIN — CLOPIDOGREL BISULFATE 75 MG: 75 TABLET ORAL at 08:26

## 2021-08-04 RX ADMIN — AZITHROMYCIN MONOHYDRATE 500 MG: 250 TABLET ORAL at 16:13

## 2021-08-04 RX ADMIN — ASPIRIN 81 MG: 81 TABLET, COATED ORAL at 08:26

## 2021-08-04 RX ADMIN — ATORVASTATIN CALCIUM 40 MG: 40 TABLET, FILM COATED ORAL at 22:32

## 2021-08-04 RX ADMIN — SODIUM CHLORIDE, PRESERVATIVE FREE 10 ML: 5 INJECTION INTRAVENOUS at 11:31

## 2021-08-04 RX ADMIN — SODIUM CHLORIDE, PRESERVATIVE FREE 10 ML: 5 INJECTION INTRAVENOUS at 22:34

## 2021-08-04 RX ADMIN — BUPROPION HYDROCHLORIDE 150 MG: 75 TABLET, FILM COATED ORAL at 08:26

## 2021-08-04 RX ADMIN — DIPHENHYDRAMINE HYDROCHLORIDE 25 MG: 25 CAPSULE ORAL at 18:16

## 2021-08-04 RX ADMIN — BUPROPION HYDROCHLORIDE 150 MG: 75 TABLET, FILM COATED ORAL at 22:32

## 2021-08-04 RX ADMIN — LISINOPRIL 10 MG: 10 TABLET ORAL at 10:18

## 2021-08-04 RX ADMIN — INSULIN LISPRO 2 UNITS: 100 INJECTION, SOLUTION INTRAVENOUS; SUBCUTANEOUS at 12:40

## 2021-08-04 NOTE — PROGRESS NOTES
[facility]   Hospitalist Progress Note  Date: 2021  Patient Name: Nelda Mckeon  : 1975  MRN: [id number]  Date of admission: 2021      Subjective   Subjective     Chief Complaint: Body aches     Summary:  45 yo female with DM, HTN, HLD, CAD p/w pain all over.  She was unvaccinated against COVID.  She tested positive for COVID.    Interval Followup: Says she feels much better, occasional shortness of breath.  Remains on room air.  Eager to go home soon.    Review of Systems   All systems were reviewed and negative except for: myalgias, cough    Objective   Objective     Vitals:   Temp:  [98.1 °F (36.7 °C)-100.4 °F (38 °C)] 98.4 °F (36.9 °C)  Heart Rate:  [68-79] 74  Resp:  [16-18] 18  BP: ()/(63-74) 116/67  Physical Exam    Constitutional: Awake, alert, no acute distress   Eyes: Pupils equal, sclerae anicteric   HENT: NCAT   Neck: trachea midline   Respiratory: nonlabored respirations, no stridor, lungs slightly diminished   Cardiovascular: no jvd, S1-S2   gastrointestinal:  nondistended   Musculoskeletal: No gross focal deficits   Psychiatric: Appropriate affect, cooperative   Neurologic: Oriented x 3, speech clear   Skin: No rashes     Result Review    Result Review:  I have personally reviewed the results from the time of this admission to 2021 18:15 EDT and agree with these findings:  [x]  Laboratory  [x]  Microbiology  []  Radiology  []  EKG/Telemetry   []  Cardiology/Vascular   []  Pathology  []  Old records  []  Other:    Assessment/Plan   Assessment / Plan     Assessment:  COVID-19 pneumonia  DM2  HTN  HLD  CAD  Rash to arm    Plan:  Remdesivir x 5 days or until ready for DC  Steroid not indicated at this time as not hypoxic  Benadryl as needed for itching  Maybe home tomorrow or Friday    justino RN    DVT prophylaxis:  Mechanical DVT prophylaxis orders are present.    CODE STATUS:   Code Status: CPR  Medical Interventions (Level of Support Prior to Arrest):  Full      Electronically signed by Jeronimo Prieto MD, 08/04/21, 6:16 PM EDT.

## 2021-08-04 NOTE — PLAN OF CARE
Goal Outcome Evaluation:  Plan of Care Reviewed With: patient        Progress: improving  Outcome Summary: denies chest pain overnight. room air. VSS. up ad mark. no other complaints reported. rested well overnight.

## 2021-08-04 NOTE — PROGRESS NOTES
[facility]   Hospitalist Progress Note  Date: 8/3/2021  Patient Name: Nelda Mckeon  : 1975  MRN: [id number]  Date of admission: 2021      Subjective   Subjective     Chief Complaint: Body aches     Summary:  45 yo female with DM, HTN, HLD, CAD p/w pain all over.  She was unvaccinated against COVID.  She tested positive for COVID.    Interval Followup: Feeling better.  No CP.  Breathing okay.    Review of Systems   All systems were reviewed and negative except for: myalgias, cough    Objective   Objective     Vitals:   Temp:  [98.6 °F (37 °C)-100.4 °F (38 °C)] 100.4 °F (38 °C)  Heart Rate:  [70-83] 79  Resp:  [13-18] 18  BP: ()/(56-67) 114/67  Physical Exam    Constitutional: Awake, alert, no acute distress   Eyes: Pupils equal, sclerae anicteric   HENT: NCAT   Neck: trachea midline   Respiratory: nonlabored respirations, no stridor   Cardiovascular: no jvd   Gastrointestinal:  nondistended   Musculoskeletal: No gross focal deficits   Psychiatric: Appropriate affect, cooperative   Neurologic: Oriented x 3, speech clear   Skin: No rashes     Result Review    Result Review:  I have personally reviewed the results from the time of this admission to 8/3/2021 23:24 EDT and agree with these findings:  [x]  Laboratory  [x]  Microbiology  []  Radiology  []  EKG/Telemetry   []  Cardiology/Vascular   []  Pathology  []  Old records  []  Other:    Assessment/Plan   Assessment / Plan     Assessment:  COVID-19 pneumonia  DM2  HTN  HLD  CAD    Plan:  Begin remdesivir  Steroid not indicated at this time as not hypoxic  No CP, troponins negative  Abx      DVT prophylaxis:  Mechanical DVT prophylaxis orders are present.    CODE STATUS:   Code Status: CPR  Medical Interventions (Level of Support Prior to Arrest): Full        Electronically signed by Jeronimo Prieto MD, 21, 11:24 PM EDT.

## 2021-08-04 NOTE — PLAN OF CARE
Goal Outcome Evaluation:         PT STARTED ON PO AZITHROMYCIN AND BENADRYL DUE TO RASH ON ARM.

## 2021-08-05 LAB
GLUCOSE BLDC GLUCOMTR-MCNC: 123 MG/DL (ref 70–99)
GLUCOSE BLDC GLUCOMTR-MCNC: 229 MG/DL (ref 70–99)
GLUCOSE BLDC GLUCOMTR-MCNC: 385 MG/DL (ref 70–99)
GLUCOSE BLDC GLUCOMTR-MCNC: 393 MG/DL (ref 70–99)

## 2021-08-05 PROCEDURE — 25010000002 ENOXAPARIN PER 10 MG: Performed by: INTERNAL MEDICINE

## 2021-08-05 PROCEDURE — 25010000002 CEFTRIAXONE PER 250 MG: Performed by: PHYSICIAN ASSISTANT

## 2021-08-05 PROCEDURE — 82962 GLUCOSE BLOOD TEST: CPT

## 2021-08-05 PROCEDURE — 25010000002 DEXAMETHASONE PER 1 MG: Performed by: INTERNAL MEDICINE

## 2021-08-05 PROCEDURE — 63710000001 INSULIN LISPRO (HUMAN) PER 5 UNITS: Performed by: PHYSICIAN ASSISTANT

## 2021-08-05 PROCEDURE — 99233 SBSQ HOSP IP/OBS HIGH 50: CPT | Performed by: INTERNAL MEDICINE

## 2021-08-05 PROCEDURE — 63710000001 INSULIN DETEMIR PER 5 UNITS: Performed by: INTERNAL MEDICINE

## 2021-08-05 PROCEDURE — 63710000001 DIPHENHYDRAMINE PER 50 MG: Performed by: INTERNAL MEDICINE

## 2021-08-05 RX ORDER — DEXAMETHASONE SODIUM PHOSPHATE 10 MG/ML
6 INJECTION INTRAMUSCULAR; INTRAVENOUS DAILY
Status: DISCONTINUED | OUTPATIENT
Start: 2021-08-05 | End: 2021-08-06 | Stop reason: HOSPADM

## 2021-08-05 RX ORDER — IPRATROPIUM BROMIDE AND ALBUTEROL SULFATE 2.5; .5 MG/3ML; MG/3ML
3 SOLUTION RESPIRATORY (INHALATION)
Status: DISCONTINUED | OUTPATIENT
Start: 2021-08-05 | End: 2021-08-06

## 2021-08-05 RX ADMIN — INSULIN LISPRO 3 UNITS: 100 INJECTION, SOLUTION INTRAVENOUS; SUBCUTANEOUS at 13:15

## 2021-08-05 RX ADMIN — CLOPIDOGREL BISULFATE 75 MG: 75 TABLET ORAL at 10:35

## 2021-08-05 RX ADMIN — INSULIN DETEMIR 5 UNITS: 100 INJECTION, SOLUTION SUBCUTANEOUS at 21:04

## 2021-08-05 RX ADMIN — ASPIRIN 81 MG: 81 TABLET, COATED ORAL at 10:35

## 2021-08-05 RX ADMIN — DEXAMETHASONE SODIUM PHOSPHATE 6 MG: 10 INJECTION INTRAMUSCULAR; INTRAVENOUS at 10:36

## 2021-08-05 RX ADMIN — BUPROPION HYDROCHLORIDE 150 MG: 75 TABLET, FILM COATED ORAL at 21:09

## 2021-08-05 RX ADMIN — LISINOPRIL 10 MG: 10 TABLET ORAL at 10:35

## 2021-08-05 RX ADMIN — REMDESIVIR 100 MG: 100 INJECTION, POWDER, LYOPHILIZED, FOR SOLUTION INTRAVENOUS at 14:18

## 2021-08-05 RX ADMIN — BUPROPION HYDROCHLORIDE 150 MG: 75 TABLET, FILM COATED ORAL at 10:35

## 2021-08-05 RX ADMIN — SODIUM CHLORIDE, PRESERVATIVE FREE 10 ML: 5 INJECTION INTRAVENOUS at 21:10

## 2021-08-05 RX ADMIN — AZITHROMYCIN MONOHYDRATE 500 MG: 250 TABLET ORAL at 10:35

## 2021-08-05 RX ADMIN — ENOXAPARIN SODIUM 40 MG: 40 INJECTION SUBCUTANEOUS at 17:29

## 2021-08-05 RX ADMIN — CEFTRIAXONE 1 G: 10 INJECTION, POWDER, FOR SOLUTION INTRAVENOUS at 21:08

## 2021-08-05 RX ADMIN — DIPHENHYDRAMINE HYDROCHLORIDE 25 MG: 25 CAPSULE ORAL at 21:14

## 2021-08-05 RX ADMIN — ATORVASTATIN CALCIUM 40 MG: 40 TABLET, FILM COATED ORAL at 21:09

## 2021-08-05 RX ADMIN — INSULIN LISPRO 6 UNITS: 100 INJECTION, SOLUTION INTRAVENOUS; SUBCUTANEOUS at 17:59

## 2021-08-05 RX ADMIN — SODIUM CHLORIDE, PRESERVATIVE FREE 10 ML: 5 INJECTION INTRAVENOUS at 10:37

## 2021-08-05 NOTE — CASE MANAGEMENT/SOCIAL WORK
Discharge Planning Assessment   [facility]     Patient Name: Nelda Mckeon  MRN: [id number]  Today's Date: 8/5/2021    Admit Date: 8/2/2021    Discharge Needs Assessment     Row Name 08/05/21 1021       Living Environment    Lives With  alone    Current Living Arrangements  home/apartment/condo    Primary Care Provided by  self    Provides Primary Care For  no one    Quality of Family Relationships  helpful;supportive    Able to Return to Prior Arrangements  yes       Resource/Environmental Concerns    Resource/Environmental Concerns  none       Transition Planning    Patient/Family Anticipates Transition to  home    Patient/Family Anticipated Services at Transition  none       Discharge Needs Assessment    Readmission Within the Last 30 Days  no previous admission in last 30 days    Equipment Currently Used at Home  none    Concerns to be Addressed  no discharge needs identified    Equipment Needed After Discharge  none    Discharge Coordination/Progress  Patient is covid positive as of 8/3. Pt is currently on room air but reports SOB when ambulating. 6MWT at discharge. Pt reported she lives alone and is fully independent. Pt denied any concerns with transportation and or affording her medications. Pt denied the need for rehab or home health.        Discharge Plan    No documentation.       Continued Care and Services - Admitted Since 8/2/2021    Coordination has not been started for this encounter.         Demographic Summary     Row Name 08/05/21 1020       General Information    Admission Type  inpatient    Arrived From  home    Referral Source  admission list    Reason for Consult  discharge planning    Preferred Language  English        Functional Status     Row Name 08/05/21 1020       Functional Status    Usual Activity Tolerance  excellent    Current Activity Tolerance  good    Functional Status Comments  Patient is currently on room air but reproted when she is ambulating she gets short of breath.        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        Psychosocial    No documentation.       Abuse/Neglect    No documentation.       Legal    No documentation.       Substance Abuse    No documentation.       Patient Forms    No documentation.           RIK Cool

## 2021-08-05 NOTE — PLAN OF CARE
Goal Outcome Evaluation:  Plan of Care Reviewed With: patient        Progress: no change   Pt has a lot of anxiety going on on this shift, will continue to monitor.

## 2021-08-05 NOTE — PROGRESS NOTES
[facility]   Hospitalist Progress Note  Date: 2021  Patient Name: Nelda Mckeon  : 1975  MRN: [id number]  Date of admission: 2021      Subjective   Subjective     Chief Complaint: Body aches     Summary:  47 yo female with DM, HTN, HLD, CAD p/w pain all over.  She was unvaccinated against COVID.  She tested positive for COVID.    Interval Followup: Feels better than yesterday she says, no new complaints. Oxygen a bit lower.    Review of Systems   All systems were reviewed and negative except for: cough    Objective   Objective     Vitals:   Temp:  [98 °F (36.7 °C)-99.3 °F (37.4 °C)] 98 °F (36.7 °C)  Heart Rate:  [62-71] 66  Resp:  [18-20] 20  BP: (104-125)/(59-71) 119/70  Physical Exam    Constitutional: Awake, alert, no acute distress   Eyes: Pupils equal, sclerae anicteric   HENT: NCAT   Neck: trachea midline   Respiratory: nonlabored respirations, no stridor, lungs clear   Cardiovascular: no jvd, S1-S2   gastrointestinal:  nondistended   Musculoskeletal: No gross focal deficits   Psychiatric: Appropriate affect, cooperative   Neurologic: Oriented x 3, speech clear   Skin: No rashes     Result Review    Result Review:  I have personally reviewed the results from the time of this admission to 2021 15:31 EDT and agree with these findings:  [x]  Laboratory  [x]  Microbiology  []  Radiology  []  EKG/Telemetry   []  Cardiology/Vascular   []  Pathology  []  Old records  []  Other:    Assessment/Plan   Assessment / Plan     Assessment:  COVID-19 pneumonia  DM2  HTN  HLD  CAD  Rash to arm    Plan:  Remdesivir x 5 days or until ready for DC  Oxygen slightly lower, though transiently, will begin Decadron  Benadryl as needed for itching  Home tomorrow if oxygen okay    dw RN    DVT prophylaxis:  Medical and mechanical DVT prophylaxis orders are present.    CODE STATUS:   Code Status: CPR  Medical Interventions (Level of Support Prior to Arrest): Full      Electronically signed by Jeronimo ZAVALA  MD Conner, 08/05/21, 3:33 PM EDT.

## 2021-08-06 ENCOUNTER — READMISSION MANAGEMENT (OUTPATIENT)
Dept: CALL CENTER | Facility: HOSPITAL | Age: 46
End: 2021-08-06

## 2021-08-06 VITALS
HEART RATE: 64 BPM | TEMPERATURE: 98.1 F | OXYGEN SATURATION: 92 % | BODY MASS INDEX: 34.78 KG/M2 | DIASTOLIC BLOOD PRESSURE: 72 MMHG | HEIGHT: 64 IN | SYSTOLIC BLOOD PRESSURE: 159 MMHG | RESPIRATION RATE: 20 BRPM | WEIGHT: 203.71 LBS

## 2021-08-06 LAB
ALBUMIN SERPL-MCNC: 3.5 G/DL (ref 3.5–5.2)
ALBUMIN/GLOB SERPL: 1.2 G/DL
ALP SERPL-CCNC: 70 U/L (ref 39–117)
ALT SERPL W P-5'-P-CCNC: 16 U/L (ref 1–33)
ANION GAP SERPL CALCULATED.3IONS-SCNC: 14.1 MMOL/L (ref 5–15)
AST SERPL-CCNC: 14 U/L (ref 1–32)
BILIRUB SERPL-MCNC: 0.2 MG/DL (ref 0–1.2)
BUN SERPL-MCNC: 18 MG/DL (ref 6–20)
BUN/CREAT SERPL: 24.7 (ref 7–25)
CALCIUM SPEC-SCNC: 9.1 MG/DL (ref 8.6–10.5)
CHLORIDE SERPL-SCNC: 99 MMOL/L (ref 98–107)
CO2 SERPL-SCNC: 21.9 MMOL/L (ref 22–29)
CREAT SERPL-MCNC: 0.73 MG/DL (ref 0.57–1)
DEPRECATED RDW RBC AUTO: 38.4 FL (ref 37–54)
ERYTHROCYTE [DISTWIDTH] IN BLOOD BY AUTOMATED COUNT: 12.6 % (ref 12.3–15.4)
GFR SERPL CREATININE-BSD FRML MDRD: 85 ML/MIN/1.73
GLOBULIN UR ELPH-MCNC: 2.9 GM/DL
GLUCOSE BLDC GLUCOMTR-MCNC: 231 MG/DL (ref 70–99)
GLUCOSE BLDC GLUCOMTR-MCNC: 354 MG/DL (ref 70–99)
GLUCOSE BLDC GLUCOMTR-MCNC: 367 MG/DL (ref 70–99)
GLUCOSE SERPL-MCNC: 272 MG/DL (ref 65–99)
HCT VFR BLD AUTO: 33 % (ref 34–46.6)
HGB BLD-MCNC: 11.1 G/DL (ref 12–15.9)
MCH RBC QN AUTO: 28.5 PG (ref 26.6–33)
MCHC RBC AUTO-ENTMCNC: 33.6 G/DL (ref 31.5–35.7)
MCV RBC AUTO: 84.6 FL (ref 79–97)
NRBC BLD AUTO-RTO: 0 /100 WBC (ref 0–0.2)
PLATELET # BLD AUTO: 183 10*3/MM3 (ref 140–450)
PMV BLD AUTO: 10.2 FL (ref 6–12)
POTASSIUM SERPL-SCNC: 3.5 MMOL/L (ref 3.5–5.2)
PROT SERPL-MCNC: 6.4 G/DL (ref 6–8.5)
RBC # BLD AUTO: 3.9 10*6/MM3 (ref 3.77–5.28)
RBC MORPH BLD: NORMAL
SMALL PLATELETS BLD QL SMEAR: ADEQUATE
SODIUM SERPL-SCNC: 135 MMOL/L (ref 136–145)
WBC # BLD AUTO: 4.44 10*3/MM3 (ref 3.4–10.8)
WBC MORPH BLD: NORMAL

## 2021-08-06 PROCEDURE — 25010000002 ENOXAPARIN PER 10 MG: Performed by: INTERNAL MEDICINE

## 2021-08-06 PROCEDURE — 99239 HOSP IP/OBS DSCHRG MGMT >30: CPT | Performed by: INTERNAL MEDICINE

## 2021-08-06 PROCEDURE — 94640 AIRWAY INHALATION TREATMENT: CPT

## 2021-08-06 PROCEDURE — 63710000001 INSULIN DETEMIR PER 5 UNITS: Performed by: INTERNAL MEDICINE

## 2021-08-06 PROCEDURE — 63710000001 INSULIN LISPRO (HUMAN) PER 5 UNITS: Performed by: INTERNAL MEDICINE

## 2021-08-06 PROCEDURE — 25010000002 DEXAMETHASONE PER 1 MG: Performed by: INTERNAL MEDICINE

## 2021-08-06 PROCEDURE — 94799 UNLISTED PULMONARY SVC/PX: CPT

## 2021-08-06 PROCEDURE — 82962 GLUCOSE BLOOD TEST: CPT

## 2021-08-06 PROCEDURE — 85025 COMPLETE CBC W/AUTO DIFF WBC: CPT | Performed by: INTERNAL MEDICINE

## 2021-08-06 PROCEDURE — 85007 BL SMEAR W/DIFF WBC COUNT: CPT | Performed by: INTERNAL MEDICINE

## 2021-08-06 PROCEDURE — 94760 N-INVAS EAR/PLS OXIMETRY 1: CPT

## 2021-08-06 PROCEDURE — 80053 COMPREHEN METABOLIC PANEL: CPT | Performed by: INTERNAL MEDICINE

## 2021-08-06 RX ORDER — DEXAMETHASONE 6 MG/1
6 TABLET ORAL
Qty: 8 TABLET | Refills: 0 | Status: SHIPPED | OUTPATIENT
Start: 2021-08-06 | End: 2021-08-14

## 2021-08-06 RX ORDER — ALBUTEROL SULFATE 90 UG/1
2 AEROSOL, METERED RESPIRATORY (INHALATION) EVERY 4 HOURS PRN
Qty: 8 G | Refills: 0 | Status: SHIPPED | OUTPATIENT
Start: 2021-08-06

## 2021-08-06 RX ORDER — FAMOTIDINE 20 MG/1
20 TABLET, FILM COATED ORAL 2 TIMES DAILY
Qty: 20 TABLET | Refills: 0 | Status: SHIPPED | OUTPATIENT
Start: 2021-08-06 | End: 2021-08-16

## 2021-08-06 RX ORDER — ALBUTEROL SULFATE 90 UG/1
2 AEROSOL, METERED RESPIRATORY (INHALATION)
Status: SHIPPED | OUTPATIENT
Start: 2021-08-06

## 2021-08-06 RX ADMIN — IPRATROPIUM BROMIDE AND ALBUTEROL SULFATE 3 ML: .5; 2.5 SOLUTION RESPIRATORY (INHALATION) at 07:16

## 2021-08-06 RX ADMIN — INSULIN LISPRO 8 UNITS: 100 INJECTION, SOLUTION INTRAVENOUS; SUBCUTANEOUS at 18:15

## 2021-08-06 RX ADMIN — INSULIN LISPRO 4 UNITS: 100 INJECTION, SOLUTION INTRAVENOUS; SUBCUTANEOUS at 09:26

## 2021-08-06 RX ADMIN — IPRATROPIUM BROMIDE AND ALBUTEROL SULFATE 3 ML: .5; 2.5 SOLUTION RESPIRATORY (INHALATION) at 01:56

## 2021-08-06 RX ADMIN — INSULIN DETEMIR 5 UNITS: 100 INJECTION, SOLUTION SUBCUTANEOUS at 18:36

## 2021-08-06 RX ADMIN — DEXAMETHASONE SODIUM PHOSPHATE 6 MG: 10 INJECTION INTRAMUSCULAR; INTRAVENOUS at 09:25

## 2021-08-06 RX ADMIN — AZITHROMYCIN MONOHYDRATE 500 MG: 250 TABLET ORAL at 09:26

## 2021-08-06 RX ADMIN — BUPROPION HYDROCHLORIDE 150 MG: 75 TABLET, FILM COATED ORAL at 10:15

## 2021-08-06 RX ADMIN — INSULIN LISPRO 8 UNITS: 100 INJECTION, SOLUTION INTRAVENOUS; SUBCUTANEOUS at 12:57

## 2021-08-06 RX ADMIN — LISINOPRIL 10 MG: 10 TABLET ORAL at 10:15

## 2021-08-06 RX ADMIN — REMDESIVIR 100 MG: 100 INJECTION, POWDER, LYOPHILIZED, FOR SOLUTION INTRAVENOUS at 13:43

## 2021-08-06 RX ADMIN — ENOXAPARIN SODIUM 40 MG: 40 INJECTION SUBCUTANEOUS at 16:55

## 2021-08-06 RX ADMIN — IPRATROPIUM BROMIDE AND ALBUTEROL SULFATE 3 ML: .5; 2.5 SOLUTION RESPIRATORY (INHALATION) at 10:59

## 2021-08-06 RX ADMIN — SODIUM CHLORIDE SOLN NEBU 3% 4 ML: 3 NEBU SOLN at 01:56

## 2021-08-06 RX ADMIN — SODIUM CHLORIDE, PRESERVATIVE FREE 10 ML: 5 INJECTION INTRAVENOUS at 09:27

## 2021-08-06 RX ADMIN — DOCUSATE SODIUM 50MG AND SENNOSIDES 8.6MG 2 TABLET: 8.6; 5 TABLET, FILM COATED ORAL at 09:26

## 2021-08-06 RX ADMIN — ASPIRIN 81 MG: 81 TABLET, COATED ORAL at 09:26

## 2021-08-06 RX ADMIN — CLOPIDOGREL BISULFATE 75 MG: 75 TABLET ORAL at 09:26

## 2021-08-06 NOTE — PLAN OF CARE
Goal Outcome Evaluation:   Patient remained on room air this shift. Patient's blood sugars ranged from 231-367. Patient was given 5 units of Levemir before discharging. Patient anticipated discharge today. Patient did fail walk test and will be discharging home with oxygen.   Problem: Adult Inpatient Plan of Care  Goal: Absence of Hospital-Acquired Illness or Injury  Intervention: Identify and Manage Fall Risk  Description: Perform standard risk assessment with a validated tool or comprehensive approach appropriate to the patient on admission; reassess fall risk frequently, with change in status or transfer to another level of care.  Communicate fall injury risk to interprofessional healthcare team.  Determine need for increased observation, equipment and environmental modification, such as low bed and signage, as well as supportive, nonskid footwear.  Adjust safety measures to individual developmental age, stage and identified risk factors.  Reinforce the importance of safety and physical activity with patient and family.  Perform regular intentional rounding to assess need for position change, pain assessment, personal needs, including assistance with toileting.  Recent Flowsheet Documentation  Taken 8/6/2021 1645 by Shira Wall, RN  Safety Promotion/Fall Prevention:   safety round/check completed   room organization consistent   nonskid shoes/slippers when out of bed   clutter free environment maintained   assistive device/personal items within reach  Taken 8/6/2021 1343 by Shira Wall, RN  Safety Promotion/Fall Prevention:   safety round/check completed   room organization consistent   nonskid shoes/slippers when out of bed   clutter free environment maintained   assistive device/personal items within reach  Taken 8/6/2021 1257 by Shira Wall, RN  Safety Promotion/Fall Prevention:   safety round/check completed   room organization consistent   nonskid shoes/slippers when out of bed   clutter free environment  maintained   assistive device/personal items within reach  Taken 8/6/2021 0926 by Shira Wall RN  Safety Promotion/Fall Prevention:   safety round/check completed   room organization consistent   nonskid shoes/slippers when out of bed   clutter free environment maintained   assistive device/personal items within reach  Intervention: Prevent Skin Injury  Description: Assess skin risk on admission and at regular intervals throughout hospital stay.  Keep all areas of skin (especially folds) clean and dry.  Maintain adequate skin hydration.  Relieve and redistribute pressure and protect bony prominences; implement measures based on patient-specific risk factors.  Match turning and repositioning schedule to clinical condition.  Encourage weight shift frequently; assist with reposition if unable to complete independently.  Float heels off bed. Avoid pressure on the Achilles tendon.  Keep skin free from extended contact with medical devices.  Use aids (e.g., slide boards, mechanical lift) during transfer.  Recent Flowsheet Documentation  Taken 8/6/2021 0926 by Shira Wall RN  Skin Protection:   transparent dressing maintained   skin-to-skin areas padded  Intervention: Prevent Infection  Description: Maintain skin and mucous membrane integrity; promote hand, oral and pulmonary hygiene.  Optimize fluid balance, nutrition, sleep and glycemic control to maximize infection resistance.  Identify potential sources of infection early to prevent or mitigate progression of infection (e.g., wound, lines, devices).  Evaluate ongoing need for invasive devices; remove promptly when no longer indicated.  Recent Flowsheet Documentation  Taken 8/6/2021 1645 by Shira Wall RN  Infection Prevention:   visitors restricted/screened   single patient room provided   rest/sleep promoted   personal protective equipment utilized   hand hygiene promoted  Taken 8/6/2021 1343 by Shira Wall RN  Infection Prevention:   visitors  restricted/screened   single patient room provided   rest/sleep promoted  Taken 8/6/2021 1257 by Shira Wall RN  Infection Prevention:   visitors restricted/screened   single patient room provided   rest/sleep promoted   personal protective equipment utilized   hand hygiene promoted  Taken 8/6/2021 0926 by Shira Wall RN  Infection Prevention:   visitors restricted/screened   single patient room provided   rest/sleep promoted   personal protective equipment utilized   hand hygiene promoted  Goal: Optimal Comfort and Wellbeing  Intervention: Provide Person-Centered Care  Description: Use a family-focused approach to care.  Develop trust and rapport by proactively providing information, encouraging questions, addressing concerns and offering reassurance.  Acknowledge emotional response to hospitalization.  Recognize and utilize personal coping strategies.  Honor spiritual and cultural preferences.  Recent Flowsheet Documentation  Taken 8/6/2021 0926 by Shira Wall RN  Trust Relationship/Rapport:   care explained   choices provided   emotional support provided   empathic listening provided   questions answered   questions encouraged   reassurance provided   thoughts/feelings acknowledged     Problem: Hypertension Comorbidity  Goal: Blood Pressure in Desired Range  Intervention: Maintain Hypertension-Management Strategies  Description: Evaluate adherence to home antihypertensive regimen (e.g., exercise and activity, diet modification, medication).  Provide scheduled antihypertensive medication; consider administration time and effects (e.g., avoid giving diuretic prior to bedtime).  Monitor response to medication therapy (e.g., blood pressure, electrolyte level, medication effects).  Minimize risk of orthostatic hypotension; encourage caution with position changes, particularly if elderly  Recent Flowsheet Documentation  Taken 8/6/2021 1645 by Shira Wall RN  Medication Review/Management: medications  reviewed  Taken 8/6/2021 1343 by Shira Wall, RN  Medication Review/Management: medications reviewed  Taken 8/6/2021 1257 by Shira Wall, RN  Medication Review/Management: medications reviewed  Taken 8/6/2021 0926 by Shira Wall, RN  Medication Review/Management: medications reviewed

## 2021-08-06 NOTE — NURSING NOTE
Exercise Oximetry    Patient Name:Nelda Mckeon   MRN: [id number]   Date: 08/06/21             ROOM AIR BASELINE   SpO2% 93   Heart Rate 70   Blood Pressure 140/68     EXERCISE ON ROOM AIR SpO2% EXERCISE ON O2 @ 1 LPM SpO2%   1 MINUTE 92 1 MINUTE 91   2 MINUTES 88 2 MINUTES 92   3 MINUTES  3 MINUTES 94   4 MINUTES  4 MINUTES 95   5 MINUTES  5 MINUTES    6 MINUTES  6 MINUTES               Distance Walked  50 feet Distance Walked   Dyspnea (Romie Scale)   Dyspnea (Romie Scale)   Fatigue (Romie Scale)   Fatigue (Romie Scale)   SpO2% Post Exercise  95 SpO2% Post Exercise   HR Post Exercise  92 HR Post Exercise   Time to Recovery  1 minute Time to Recovery     Comments: Patient had to sit due to shortness of air.

## 2021-08-07 NOTE — PAYOR COMM NOTE
"Nelda Rios (46 y.o. Female)     Date of Birth Social Security Number Address Home Phone MRN    1975  [address] [address] [phone] [id number]    Christianity Marital Status          Roman Catholic Single       Admission Date Admission Type Admitting Provider Attending Provider Department, Room/Bed    21 Emergency Jeronimo Prieto MD  [address], 3020/1    Discharge Date Discharge Disposition Discharge Destination        2021 Home or Self Care              Attending Provider: (none)   Allergies: No Known Allergies    Isolation: None   Infection: C.difficile (rule out) (21), COVID (confirmed) (21)   Code Status: Prior    Ht: 162.6 cm (64\")   Wt: 92.4 kg (203 lb 11.3 oz)    Admission Cmt: None   Principal Problem: None                Active Insurance as of 2021     Primary Coverage     Payor Plan Insurance Group Employer/Plan Group    [facility] [employer] [place] [facility]      Payor Plan Address Payor Plan Phone Number Payor Plan Fax Number Effective Dates    [address] [address]   3/1/2014 - None Entered    [place] [address]       Subscriber Name Subscriber Birth Date Member ID       NELDA RIOS 1975 [id number]                 Emergency Contacts      (Rel.) Home Phone Work Phone Mobile Phone    jeannette ordonez (Significant Other) [phone] -- [phone]    HolaEnma (Mother) [phone] -- [phone]               Discharge Summary      Jeronimo Prieto MD at [facility]                         [facility]  [facility]    Patient Name: Nelda Rios  : 1975  MRN: [id number]    Date of Admission: 2021  Date of Discharge:  2021  Primary Care Nurse Practitioner: Dave Gonzales APRN   Admitting Service:  Hospital Medicine    Final Diagnoses:      Hospital Course     Hospital Course:  Very pleasant 45 yo female with DM, HTN, " HLD, CAD p/w pain all over.  She was unvaccinated against COVID.  She tested positive for COVID.  She was started on remdesivir.  Initially not hypoxic, oxygen sats did drop to the low 90s, though mostly asymptomatic.  Began her on Decadron and can complete a course at home.  She did drop to 88% on her walk test which technically qualifies her for home oxygen.  Discussed remaining in hospital versus going home, patient wishes to go home, I think that is reasonable at this point with the understanding that she is to get a pulse oximeter, check her oxygen regularly, seek medical attention should she have any return of symptoms.  At this point she has had 4 days of remdesivir and will continue steroids at discharge.  Blood sugar was elevated likely due to steroids, unable to give home oral medications in the hospital.  Advised to resume her home medications upon discharge but to keep a close watch on her sugars and to contact PCP if they become more remains significantly elevated.  Suspect sugars will improve once off steroids.        DISCHARGE Follow Up Recommendations for labs and diagnostics: f/u pcp      Day of Discharge     Vital Signs:  Temp:  [97.8 °F (36.6 °C)-98.9 °F (37.2 °C)] 98.1 °F (36.7 °C)  Heart Rate:  [57-70] 64  Resp:  [16-20] 20  BP: (157-167)/(72-93) 159/72  Physical Exam: nad, s1s2, chest clear      Discharge Details        Discharge Medications      New Medications      Instructions Start Date   albuterol sulfate  (90 Base) MCG/ACT inhaler  Commonly known as: PROVENTIL HFA;VENTOLIN HFA;PROAIR HFA   2 puffs, Inhalation, Every 4 Hours PRN      dexamethasone 6 MG tablet  Commonly known as: DECADRON   6 mg, Oral, Daily With Breakfast      famotidine 20 MG tablet  Commonly known as: Pepcid   20 mg, Oral, 2 Times Daily         Continue These Medications      Instructions Start Date   atorvastatin 40 MG tablet  Commonly known as: LIPITOR   40 mg, Oral, Nightly      buPROPion 75 MG tablet  Commonly  known as: WELLBUTRIN   150 mg, Oral, 2 Times Daily      clopidogrel 75 MG tablet  Commonly known as: PLAVIX   75 mg, Oral, Daily      glipizide 10 MG tablet  Commonly known as: GLUCOTROL   10 mg, Oral, 2 Times Daily Before Meals      lisinopril-hydrochlorothiazide 20-12.5 MG per tablet  Commonly known as: PRINZIDE,ZESTORETIC   1 tablet, Oral, 2 times daily      metFORMIN 500 MG tablet  Commonly known as: GLUCOPHAGE   1,000 mg, Oral, 2 Times Daily With Meals      pioglitazone 45 MG tablet  Commonly known as: ACTOS   45 mg, Oral, Daily             No Known Allergies    Discharge Disposition:  Home or Self Care    Diet:  Hospital:  Diet Order   Procedures   • Diet Regular       Discharge Activity:       CODE STATUS:  Code Status and Medical Interventions:   Ordered at: 08/03/21 0255     Code Status:    CPR     Medical Interventions (Level of Support Prior to Arrest):    Full         Future Appointments   Date Time Provider Department Center   1/14/2022  1:00 PM Alejandro Espino MD [facility]           Pertinent  and/or Most Recent Results     PROCEDURES:   None    LAB RESULTS:      Lab 08/06/21 0619 08/04/21 0958 08/03/21 1410 08/02/21 2239   WBC 4.44 4.85 7.01 8.11   HEMOGLOBIN 11.1* 11.2* 13.0 13.0   HEMATOCRIT 33.0* 32.9* 38.3 37.8   PLATELETS 183 142 151 147   NEUTROS ABS  --  3.55  --  7.02*   IMMATURE GRANS (ABS)  --  0.03  --  0.03   LYMPHS ABS  --  1.08  --  0.78   MONOS ABS  --  0.18  --  0.27   EOS ABS  --  0.01  --  0.00   MCV 84.6 84.4 85.1 85.5   PROTIME  --   --  9.7  --          Lab 08/06/21 0619 08/04/21 0958 08/03/21 1410 08/02/21 2239   SODIUM 135* 129* 133* 131*   POTASSIUM 3.5 3.1* 3.2* 3.5   CHLORIDE 99 96* 95* 95*   CO2 21.9* 21.2* 23.7 20.1*   ANION GAP 14.1 11.8 14.3 15.9*   BUN 18 20 27* 32*   CREATININE 0.73 0.98 1.09* 1.28*   GLUCOSE 272* 262* 122* 229*   CALCIUM 9.1 8.4* 8.8 8.7   MAGNESIUM  --   --  1.6 1.6   PHOSPHORUS  --  2.2*  --   --    HEMOGLOBIN A1C  --   --  7.01*  --           Lab 08/06/21  0619 08/04/21  0958 08/02/21  2239   TOTAL PROTEIN 6.4 6.3 7.7   ALBUMIN 3.50 3.20* 4.00   GLOBULIN 2.9 3.1 3.7   ALT (SGPT) 16 20 32   AST (SGOT) 14 24 46*   BILIRUBIN 0.2 0.3 0.4   ALK PHOS 70 67 75   LIPASE  --   --  62*         Lab 08/03/21  1410 08/03/21  0005 08/02/21  2239   PROBNP  --   --  14.6   TROPONIN T <0.010 <0.010 <0.010   PROTIME 9.7  --   --    INR 0.86*  --   --          Lab 08/03/21  1410   CHOLESTEROL 109   LDL CHOL 37   HDL CHOL 38*   TRIGLYCERIDES 217*             Brief Urine Lab Results     None        Microbiology Results (last 10 days)     Procedure Component Value - Date/Time    S. Pneumo Ag Urine or CSF - Urine, Urine, Clean Catch [[id number]]  (Normal) Collected: 08/03/21 2112    Lab Status: Final result Specimen: Urine, Clean Catch Updated: 08/04/21 0658     Strep Pneumo Ag Negative    Legionella Antigen, Urine - Urine, Urine, Clean Catch [[id number]]  (Normal) Collected: 08/03/21 2112    Lab Status: Final result Specimen: Urine, Clean Catch Updated: 08/04/21 0658     LEGIONELLA ANTIGEN, URINE Negative    Mycoplasma Pneumoniae Antibody, IgM - Blood, Arm, Left [[id number]]  (Normal) Collected: 08/03/21 1410    Lab Status: Final result Specimen: Blood from Arm, Left Updated: 08/03/21 1541     Mycoplasma pneumo IgM Negative    COVID-19,CEPHEID,COR/LOIS/PAD/RADHA IN-HOUSE(OR EMERGENT/ADD-ON),NP SWAB IN TRANSPORT MEDIA 3-4 HR TAT, RT-PCR - Swab, Nasopharynx [[id number]]  (Abnormal) Collected: 08/03/21 0348    Lab Status: Final result Specimen: Swab from Nasopharynx Updated: 08/03/21 1146     COVID19 Detected    Narrative:      Fact sheet for providers: https://www.fda.gov/media/059177/download     Fact sheet for patients: https://www.fda.gov/media/579303/download  Fact sheet for providers: https://www.fda.gov/media/632417/download     Fact sheet for patients: https://www.fda.gov/media/603323/download             Condition at discharge: Stable for discharge          Results for  orders placed during the hospital encounter of 11/05/19    Adult Transthoracic Echo Complete W/ Cont if Necessary Per Protocol    Interpretation Summary  · Left ventricular systolic function is normal. Calculated EF = 62%. Estimated EF was in agreement with the calculated EF. Estimated EF = 62%. Normal left ventricular cavity size and wall thickness noted. All left ventricular wall segments contract normally. Left ventricular diastolic function is normal.  · Trace mitral valve regurgitation is present.  · The tricuspid valve is grossly normal. Trace tricuspid valve regurgitation is present. Estimated right ventricular systolic pressure from tricuspid regurgitation is normal (<35 mmHg). Calculated right ventricular systolic pressure from tricuspid regurgitation is 24 mmHg.      Labs Pending at Discharge: None      Time spent on Discharge including face to face service: 35 minutes    Electronically signed by Jeronimo Prieto MD, 08/06/21, 8:08 PM EDT.      Electronically signed by Jeronimo Prieto MD at 08/06/21 2012  CONTACT   DAMION S UTILIZATION REVIEW    [facility]  [address] [address]  TAX ID [id number]  [facility]  [phone]                                                            DENIED CASE                                                         #[id number]     [phone]   [phone]

## 2021-08-07 NOTE — DISCHARGE SUMMARY
[facility]         HOSPITALIST  DISCHARGE SUMMARY    Patient Name: Nelda Mckeon  : 1975  MRN: [id number]    Date of Admission: 2021  Date of Discharge:  2021  Primary Care Nurse Practitioner: Dave Gonzales APRN   Admitting Service:  Hospital Medicine    Final Diagnoses:      Hospital Course     Hospital Course:  Very pleasant 45 yo female with DM, HTN, HLD, CAD p/w pain all over.  She was unvaccinated against COVID.  She tested positive for COVID.  She was started on remdesivir.  Initially not hypoxic, oxygen sats did drop to the low 90s, though mostly asymptomatic.  Began her on Decadron and can complete a course at home.  She did drop to 88% on her walk test which technically qualifies her for home oxygen.  Discussed remaining in hospital versus going home, patient wishes to go home, I think that is reasonable at this point with the understanding that she is to get a pulse oximeter, check her oxygen regularly, seek medical attention should she have any return of symptoms.  At this point she has had 4 days of remdesivir and will continue steroids at discharge.  Blood sugar was elevated likely due to steroids, unable to give home oral medications in the hospital.  Advised to resume her home medications upon discharge but to keep a close watch on her sugars and to contact PCP if they become more remains significantly elevated.  Suspect sugars will improve once off steroids.        DISCHARGE Follow Up Recommendations for labs and diagnostics: f/u pcp      Day of Discharge     Vital Signs:  Temp:  [97.8 °F (36.6 °C)-98.9 °F (37.2 °C)] 98.1 °F (36.7 °C)  Heart Rate:  [57-70] 64  Resp:  [16-20] 20  BP: (157-167)/(72-93) 159/72  Physical Exam: nad, s1s2, chest clear      Discharge Details        Discharge Medications      New Medications      Instructions Start Date   albuterol sulfate  (90 Base) MCG/ACT inhaler  Commonly known as: PROVENTIL HFA;VENTOLIN HFA;PROAIR  HFA   2 puffs, Inhalation, Every 4 Hours PRN      dexamethasone 6 MG tablet  Commonly known as: DECADRON   6 mg, Oral, Daily With Breakfast      famotidine 20 MG tablet  Commonly known as: Pepcid   20 mg, Oral, 2 Times Daily         Continue These Medications      Instructions Start Date   atorvastatin 40 MG tablet  Commonly known as: LIPITOR   40 mg, Oral, Nightly      buPROPion 75 MG tablet  Commonly known as: WELLBUTRIN   150 mg, Oral, 2 Times Daily      clopidogrel 75 MG tablet  Commonly known as: PLAVIX   75 mg, Oral, Daily      glipizide 10 MG tablet  Commonly known as: GLUCOTROL   10 mg, Oral, 2 Times Daily Before Meals      lisinopril-hydrochlorothiazide 20-12.5 MG per tablet  Commonly known as: PRINZIDE,ZESTORETIC   1 tablet, Oral, 2 times daily      metFORMIN 500 MG tablet  Commonly known as: GLUCOPHAGE   1,000 mg, Oral, 2 Times Daily With Meals      pioglitazone 45 MG tablet  Commonly known as: ACTOS   45 mg, Oral, Daily             No Known Allergies    Discharge Disposition:  Home or Self Care    Diet:  Hospital:  Diet Order   Procedures   • Diet Regular       Discharge Activity:       CODE STATUS:  Code Status and Medical Interventions:   Ordered at: 08/03/21 0255     Code Status:    CPR     Medical Interventions (Level of Support Prior to Arrest):    Full         Future Appointments   Date Time Provider Department Center   1/14/2022  1:00 PM Alejandro Espino MD [facility]           Pertinent  and/or Most Recent Results     PROCEDURES:   None    LAB RESULTS:      Lab 08/06/21  0619 08/04/21  0958 08/03/21  1410 08/02/21  2239   WBC 4.44 4.85 7.01 8.11   HEMOGLOBIN 11.1* 11.2* 13.0 13.0   HEMATOCRIT 33.0* 32.9* 38.3 37.8   PLATELETS 183 142 151 147   NEUTROS ABS  --  3.55  --  7.02*   IMMATURE GRANS (ABS)  --  0.03  --  0.03   LYMPHS ABS  --  1.08  --  0.78   MONOS ABS  --  0.18  --  0.27   EOS ABS  --  0.01  --  0.00   MCV 84.6 84.4 85.1 85.5   PROTIME  --   --  9.7  --          Lab  08/06/21  0619 08/04/21  0958 08/03/21  1410 08/02/21  2239   SODIUM 135* 129* 133* 131*   POTASSIUM 3.5 3.1* 3.2* 3.5   CHLORIDE 99 96* 95* 95*   CO2 21.9* 21.2* 23.7 20.1*   ANION GAP 14.1 11.8 14.3 15.9*   BUN 18 20 27* 32*   CREATININE 0.73 0.98 1.09* 1.28*   GLUCOSE 272* 262* 122* 229*   CALCIUM 9.1 8.4* 8.8 8.7   MAGNESIUM  --   --  1.6 1.6   PHOSPHORUS  --  2.2*  --   --    HEMOGLOBIN A1C  --   --  7.01*  --          Lab 08/06/21 0619 08/04/21  0958 08/02/21  2239   TOTAL PROTEIN 6.4 6.3 7.7   ALBUMIN 3.50 3.20* 4.00   GLOBULIN 2.9 3.1 3.7   ALT (SGPT) 16 20 32   AST (SGOT) 14 24 46*   BILIRUBIN 0.2 0.3 0.4   ALK PHOS 70 67 75   LIPASE  --   --  62*         Lab 08/03/21  1410 08/03/21  0005 08/02/21 2239   PROBNP  --   --  14.6   TROPONIN T <0.010 <0.010 <0.010   PROTIME 9.7  --   --    INR 0.86*  --   --          Lab 08/03/21  1410   CHOLESTEROL 109   LDL CHOL 37   HDL CHOL 38*   TRIGLYCERIDES 217*             Brief Urine Lab Results     None        Microbiology Results (last 10 days)     Procedure Component Value - Date/Time    S. Pneumo Ag Urine or CSF - Urine, Urine, Clean Catch [[id number]]  (Normal) Collected: 08/03/21 2112    Lab Status: Final result Specimen: Urine, Clean Catch Updated: 08/04/21 0658     Strep Pneumo Ag Negative    Legionella Antigen, Urine - Urine, Urine, Clean Catch [[id number]]  (Normal) Collected: 08/03/21 2112    Lab Status: Final result Specimen: Urine, Clean Catch Updated: 08/04/21 0658     LEGIONELLA ANTIGEN, URINE Negative    Mycoplasma Pneumoniae Antibody, IgM - Blood, Arm, Left [[id number]]  (Normal) Collected: 08/03/21 1410    Lab Status: Final result Specimen: Blood from Arm, Left Updated: 08/03/21 1541     Mycoplasma pneumo IgM Negative    COVID-19,CEPHEID,COR/LOIS/PAD/RADHA IN-HOUSE(OR EMERGENT/ADD-ON),NP SWAB IN TRANSPORT MEDIA 3-4 HR TAT, RT-PCR - Swab, Nasopharynx [[id number]]  (Abnormal) Collected: 08/03/21 0348    Lab Status: Final result Specimen: Swab from Nasopharynx  Updated: 08/03/21 1146     COVID19 Detected    Narrative:      Fact sheet for providers: https://www.fda.gov/media/333648/download     Fact sheet for patients: https://www.fda.gov/media/531411/download  Fact sheet for providers: https://www.fda.gov/media/070085/download     Fact sheet for patients: https://www.fda.gov/media/725906/download             Condition at discharge: Stable for discharge          Results for orders placed during the hospital encounter of 11/05/19    Adult Transthoracic Echo Complete W/ Cont if Necessary Per Protocol    Interpretation Summary  · Left ventricular systolic function is normal. Calculated EF = 62%. Estimated EF was in agreement with the calculated EF. Estimated EF = 62%. Normal left ventricular cavity size and wall thickness noted. All left ventricular wall segments contract normally. Left ventricular diastolic function is normal.  · Trace mitral valve regurgitation is present.  · The tricuspid valve is grossly normal. Trace tricuspid valve regurgitation is present. Estimated right ventricular systolic pressure from tricuspid regurgitation is normal (<35 mmHg). Calculated right ventricular systolic pressure from tricuspid regurgitation is 24 mmHg.      Labs Pending at Discharge: None      Time spent on Discharge including face to face service: 35 minutes    Electronically signed by Jeronimo Prieto MD, 08/06/21, 8:08 PM EDT.

## 2021-08-07 NOTE — OUTREACH NOTE
Prep Survey      Responses   [facility] facility patient discharged from?  [facility]   Is LACE score < 7 ?  No   Emergency Room discharge w/ pulse ox?  No   Eligibility  [facility]  [facility]   Date of Admission  08/02/21   Date of Discharge  08/06/21   Discharge Disposition  Home or Self Care   Discharge diagnosis  COVID-19   Does the patient have one of the following disease processes/diagnoses(primary or secondary)?  COVID-19   Does the patient have Home health ordered?  No   Is there a DME ordered?  Yes   What DME was ordered?  O2 from [employer]   Prep survey completed?  Yes          Neeru Baker RN

## 2021-08-08 ENCOUNTER — READMISSION MANAGEMENT (OUTPATIENT)
Dept: CALL CENTER | Facility: HOSPITAL | Age: 46
End: 2021-08-08

## 2021-08-08 NOTE — OUTREACH NOTE
COVID-19 Week 1 Survey      Responses   [facility] patient discharged from?  [facility]   Does the patient have one of the following disease processes/diagnoses(primary or secondary)?  COVID-19   COVID-19 underlying condition?  None   Call Number  Call 1   Week 1 Call successful?  No   Discharge diagnosis  COVID-19          Martha Simpson RN

## 2021-08-09 ENCOUNTER — READMISSION MANAGEMENT (OUTPATIENT)
Dept: CALL CENTER | Facility: HOSPITAL | Age: 46
End: 2021-08-09

## 2021-08-09 LAB
QT INTERVAL: 362 MS
QT INTERVAL: 393 MS
QT INTERVAL: 410 MS
QT INTERVAL: 435 MS

## 2021-08-09 NOTE — OUTREACH NOTE
COVID-19 Week 1 Survey      Responses   [facility] patient discharged from?  [facility]   Does the patient have one of the following disease processes/diagnoses(primary or secondary)?  COVID-19   COVID-19 underlying condition?  None   Call Number  Call 2   Week 1 Call successful?  Yes   Call start time  0953   Call end time  0956   Discharge diagnosis  COVID-19   What DME was ordered?  O2 from [employer]   Did the patient receive a copy of their discharge instructions?  Yes   Did the patient receive a copy of COVID-19 specific instructions?  Yes   Nursing interventions  Reviewed instructions with patient   What is the patient's perception of their health status since discharge?  Improving   Does the patient have any of the following symptoms?  Shortness of breath   COVID-19 call completed?  Yes   Wrap up additional comments  Call was not completed. Call was ended abruptly, mid conversation, attempted to call back and went to           Danisha Rojas RN

## 2021-08-10 ENCOUNTER — READMISSION MANAGEMENT (OUTPATIENT)
Dept: CALL CENTER | Facility: HOSPITAL | Age: 46
End: 2021-08-10

## 2021-08-10 NOTE — OUTREACH NOTE
COVID-19 Week 1 Survey      Responses   [facility] patient discharged from?  [facility]   Does the patient have one of the following disease processes/diagnoses(primary or secondary)?  COVID-19   COVID-19 underlying condition?  None   Call Number  Call 3   Week 1 Call successful?  Yes   Call start time  1334   Call end time  1338   Discharge diagnosis  COVID-19   Meds reviewed with patient/caregiver?  Yes   Is the patient having any side effects they believe may be caused by any medication additions or changes?  No   Does the patient have all medications ordered at discharge?  Yes   Is the patient taking all medications as directed (includes completed medication regime)?  Yes   Medication comments  still on steroids   Has home health visited the patient within 72 hours of discharge?  N/A   What DME was ordered?  O2 from [employer]   Has all DME been delivered?  Yes   Psychosocial issues?  No   What is the patient's perception of their health status since discharge?  Improving   Does the patient have any of the following symptoms?  Shortness of breath   Nursing Interventions  Nurse provided patient education   Pulse Ox monitoring  Intermittent   Pulse Ox device source  Patient   O2 Sat comments  96% on RA---oxygen with exertion. 1-2 Liters   O2 Sat: education provided  Sat levels, When to seek care   O2 Sat education comments  Keep sats above 90% and come to ED if dropping   Is the patient/caregiver able to teach back steps to recovery at home?  Rest and rebuild strength, gradually increase activity, Eat a well-balance diet   If the patient is a current smoker, are they able to teach back resources for cessation?  Not a smoker   Is the patient/caregiver able to teach back the hierarchy of who to call/visit for symptoms/problems? PCP, Specialist, Home health nurse, Urgent Care, ED, 911  Yes   COVID-19 call completed?  Yes   Wrap up additional comments  Call was not completed. Call was ended abruptly, mid conversation,  attempted to call back and went to           Krupa Page RN

## 2021-08-16 ENCOUNTER — READMISSION MANAGEMENT (OUTPATIENT)
Dept: CALL CENTER | Facility: HOSPITAL | Age: 46
End: 2021-08-16

## 2021-08-16 NOTE — OUTREACH NOTE
COVID-19 Week 2 Survey      Responses   [facility] patient discharged from?  [facility]   Does the patient have one of the following disease processes/diagnoses(primary or secondary)?  COVID-19   COVID-19 underlying condition?  None   Call Number  Call 1   COVID-19 Week 2: Call 1 attempt successful?  Yes   Call start time  1257   Call end time  1305   Discharge diagnosis  COVID-19   Is patient permission given to speak with other caregiver?  Yes   List who call center can speak with  SO or mother   Meds reviewed with patient/caregiver?  Yes   Is the patient taking all medications as directed (includes completed medication regime)?  Yes   Does the patient have a primary care provider?   Yes   Does the patient have an appointment with their PCP or specialist within 7 days of discharge?  No   What is preventing the patient from scheduling follow up appointments within 7 days of discharge?  Waiting on return call   Nursing Interventions  Educated patient on importance of making appointment   Has the patient kept scheduled appointments due by today?  N/A   What DME was ordered?  O2 from [employer]   Has all DME been delivered?  Yes   DME comments  O2 prn   Psychosocial issues?  No   What is the patient's perception of their health status since discharge?  Improving   Does the patient have any of the following symptoms?  None [headache]   Nursing Interventions  Nurse provided patient education   Pulse Ox monitoring  Intermittent   Pulse Ox device source  Patient   O2 Sat comments  97% RA   O2 Sat: education provided  Sat levels   O2 Sat education comments  Keep sats above 90% and come to ED if dropping   Is the patient/caregiver able to teach back steps to recovery at home?  Set small, achievable goals for return to baseline health, Rest and rebuild strength, gradually increase activity   If the patient is a current smoker, are they able to teach back resources for cessation?  Not a smoker   Is the patient/caregiver able  to teach back the hierarchy of who to call/visit for symptoms/problems? PCP, Specialist, Home health nurse, Urgent Care, ED, 911  Yes   COVID-19 call completed?  Yes   Wrap up additional comments  Out of quarantine 8-17 she reports          Danisha Rojas, RN

## 2021-08-20 ENCOUNTER — READMISSION MANAGEMENT (OUTPATIENT)
Dept: CALL CENTER | Facility: HOSPITAL | Age: 46
End: 2021-08-20

## 2021-08-20 NOTE — OUTREACH NOTE
COVID-19 Week 3 Survey      Responses   [facility] patient discharged from?  [facility]   Does the patient have one of the following disease processes/diagnoses(primary or secondary)?  COVID-19   COVID-19 underlying condition?  None   Call Number  Call 1   COVID-19 Week 3: Call 1 attempt successful?  Yes   Call start time  1728   Call end time  1731   Discharge diagnosis  COVID-19   Meds reviewed with patient/caregiver?  Yes   Is the patient having any side effects they believe may be caused by any medication additions or changes?  No   Does the patient have all medications ordered at discharge?  Yes   Is the patient taking all medications as directed (includes completed medication regime)?  Yes   Does the patient have a primary care provider?   Yes   Does the patient have an appointment with their PCP or specialist within 7 days of discharge?  No   What is preventing the patient from scheduling follow up appointments within 7 days of discharge?  Waiting on return call, Haven't had time   Has the patient kept scheduled appointments due by today?  N/A   Has home health visited the patient within 72 hours of discharge?  N/A   Psychosocial issues?  No   Comments  is back to work, not on O2   Did the patient receive a copy of their discharge instructions?  Yes   Did the patient receive a copy of COVID-19 specific instructions?  Yes   Nursing interventions  Reviewed instructions with patient   What is the patient's perception of their health status since discharge?  Improving   Does the patient have any of the following symptoms?  None [fatigue]   Nursing Interventions  Nurse provided patient education   Pulse Ox monitoring  Intermittent   Pulse Ox device source  Patient   O2 Sat comments  96-98% on RA   O2 Sat: education provided  Sat levels   Is the patient/caregiver able to teach back steps to recovery at home?  Set small, achievable goals for return to baseline health, Rest and rebuild strength, gradually increase  activity, Eat a well-balance diet   Is the patient/caregiver able to teach back the hierarchy of who to call/visit for symptoms/problems? PCP, Specialist, Home health nurse, Urgent Care, ED, 911  Yes   COVID-19 call completed?  Yes          Argentina Schmid RN

## 2021-08-27 ENCOUNTER — READMISSION MANAGEMENT (OUTPATIENT)
Dept: CALL CENTER | Facility: HOSPITAL | Age: 46
End: 2021-08-27

## 2021-08-27 NOTE — OUTREACH NOTE
COVID-19 Week 4 Survey      Responses   [facility] patient discharged from?  [facility]   Does the patient have one of the following disease processes/diagnoses(primary or secondary)?  COVID-19   COVID-19 underlying condition?  None   Call Number  Call 1   COVID-19 Week 4: Call 1 attempt successful?  No   Discharge diagnosis  COVID-19          Danisha Rojas RN

## 2022-01-14 ENCOUNTER — OFFICE VISIT (OUTPATIENT)
Dept: CARDIOLOGY | Facility: CLINIC | Age: 47
End: 2022-01-14

## 2022-01-14 VITALS
DIASTOLIC BLOOD PRESSURE: 70 MMHG | BODY MASS INDEX: 38.38 KG/M2 | HEART RATE: 73 BPM | SYSTOLIC BLOOD PRESSURE: 128 MMHG | WEIGHT: 224.8 LBS | HEIGHT: 64 IN

## 2022-01-14 DIAGNOSIS — E66.01 CLASS 2 SEVERE OBESITY DUE TO EXCESS CALORIES WITH SERIOUS COMORBIDITY AND BODY MASS INDEX (BMI) OF 38.0 TO 38.9 IN ADULT: ICD-10-CM

## 2022-01-14 DIAGNOSIS — I21.11 STEMI INVOLVING RIGHT CORONARY ARTERY: Primary | ICD-10-CM

## 2022-01-14 DIAGNOSIS — E78.5 HYPERLIPIDEMIA, UNSPECIFIED HYPERLIPIDEMIA TYPE: ICD-10-CM

## 2022-01-14 DIAGNOSIS — E11.65 POORLY CONTROLLED TYPE 2 DIABETES MELLITUS: ICD-10-CM

## 2022-01-14 DIAGNOSIS — I10 ESSENTIAL HYPERTENSION: ICD-10-CM

## 2022-01-14 PROBLEM — Z72.0 TOBACCO ABUSE: Status: RESOLVED | Noted: 2019-11-05 | Resolved: 2022-01-14

## 2022-01-14 PROCEDURE — 99214 OFFICE O/P EST MOD 30 MIN: CPT | Performed by: INTERNAL MEDICINE

## 2022-01-14 PROCEDURE — 93000 ELECTROCARDIOGRAM COMPLETE: CPT | Performed by: INTERNAL MEDICINE

## 2022-01-14 NOTE — PROGRESS NOTES
Date of Office Visit: 22  Encounter Provider: Alejandro Espino MD  Place of Service: [facility] CARDIOLOGY  Patient Name: Nelda Mckeon  :1975  [id number]    Chief Complaint   Patient presents with   • Coronary Artery Disease   :     HPI: Nelda Mckeon is a 46 y.o. female  had a stuttering inferior STEMI in 2019 she was chopper to appear from [facility] [place].  She had 2 drug-eluting stents implanted to her RCA 1 distal RCA one in the proximal RCA but she also had disease in her mid LAD that was stented also LV function was normal she had a little bit of disease in her circumflex and an OM1 but it was not really obstructive.  She is here for follow-up she has been doing pretty well from a heart standpoint no chest pain shortness of breath PND orthopnea edema.  She had COVID in August and she has not been vaccinated does not really have a reason for doing it just does not want to do it.  She is not smoking anymore    Past Medical History:   Diagnosis Date   • Diabetes mellitus (HCC)    • Hypertension    • Myocardial infarction (HCC)        Past Surgical History:   Procedure Laterality Date   • CARDIAC CATHETERIZATION N/A 2019    Procedure: Left Heart Cath;  Surgeon: Alejandro Espino MD;  Location: [facility] CATH INVASIVE LOCATION;  Service: Cardiovascular   • CARDIAC CATHETERIZATION N/A 2019    Procedure: Stent BILL coronary;  Surgeon: Alejandro Espino MD;  Location: [facility] CATH INVASIVE LOCATION;  Service: Cardiovascular   • CARDIAC CATHETERIZATION N/A 2019    Procedure: Left ventriculography;  Surgeon: Alejandro Espino MD;  Location: [facility] CATH INVASIVE LOCATION;  Service: Cardiovascular   • CARDIAC CATHETERIZATION N/A 2019    Procedure: Coronary angiography;  Surgeon: Alejandro Espino MD;  Location: [facility] CATH INVASIVE LOCATION;  Service: Cardiovascular   • CHOLECYSTECTOMY     • [place] RT/LT HEART CATHETERS N/A 2019     "Procedure: Percutaneous Coronary Intervention;  Surgeon: Alejandro Espino MD;  Location: [facility] INVASIVE LOCATION;  Service: Cardiovascular       Social History     Socioeconomic History   • Marital status: Single   Tobacco Use   • Smoking status: Former Smoker     Packs/day: 1.00     Years: 30.00     Pack years: 30.00     Types: Cigarettes   • Smokeless tobacco: Never Used   • Tobacco comment: CAFFEINE USE: 4 COKE ZEROS DAILY   Vaping Use   • Vaping Use: Never used   Substance and Sexual Activity   • Alcohol use: Yes     Comment: \"very seldom, maybe one drink every 5-6 months\" per pt   • Drug use: No   • Sexual activity: Defer       History reviewed. No pertinent family history.    Review of Systems   Constitutional: Negative for decreased appetite, fever, malaise/fatigue and weight loss.   HENT: Negative for nosebleeds.    Eyes: Negative for double vision.   Cardiovascular: Negative for chest pain, claudication, cyanosis, dyspnea on exertion, irregular heartbeat, leg swelling, near-syncope, orthopnea, palpitations, paroxysmal nocturnal dyspnea and syncope.   Respiratory: Negative for cough, hemoptysis and shortness of breath.    Hematologic/Lymphatic: Negative for bleeding problem.   Skin: Negative for rash.   Musculoskeletal: Negative for falls and myalgias.   Gastrointestinal: Negative for hematochezia, jaundice, melena, nausea and vomiting.   Genitourinary: Negative for hematuria.   Neurological: Negative for dizziness and seizures.   Psychiatric/Behavioral: Negative for altered mental status and memory loss.       No Known Allergies      Current Outpatient Medications:   •  atorvastatin (LIPITOR) 40 MG tablet, Take 1 tablet by mouth Every Night., Disp: 90 tablet, Rfl: 3  •  buPROPion (WELLBUTRIN) 75 MG tablet, Take 150 mg by mouth 2 (Two) Times a Day., Disp: , Rfl:   •  clopidogrel (PLAVIX) 75 MG tablet, Take 1 tablet by mouth Daily., Disp: 90 tablet, Rfl: 3  •  glipizide (GLUCOTROL) 10 MG tablet, Take 10 " "mg by mouth 2 (Two) Times a Day Before Meals., Disp: , Rfl:   •  lisinopril-hydrochlorothiazide (PRINZIDE,ZESTORETIC) 20-12.5 MG per tablet, Take 1 tablet by mouth 2 (two) times a day., Disp: , Rfl:   •  metFORMIN (GLUCOPHAGE) 500 MG tablet, Take 2 tablets by mouth 2 (Two) Times a Day With Meals., Disp: , Rfl:   •  pioglitazone (ACTOS) 45 MG tablet, Take 45 mg by mouth Daily., Disp: , Rfl:   •  albuterol sulfate  (90 Base) MCG/ACT inhaler, Inhale 2 puffs Every 4 (Four) Hours As Needed for Wheezing., Disp: 8 g, Rfl: 0    Current Facility-Administered Medications:   •  albuterol sulfate HFA (PROVENTIL HFA;VENTOLIN HFA;PROAIR HFA) inhaler 2 puff, 2 puff, Inhalation, 4x Daily - RT, Jeronimo Prieto MD      Objective:     Vitals:    01/14/22 1315   BP: 128/70   Pulse: 73   Weight: 102 kg (224 lb 12.8 oz)   Height: 162.6 cm (64\")     Body mass index is 38.59 kg/m².    Constitutional:       Appearance: Well-developed.   Eyes:      General: No scleral icterus.  HENT:      Head: Normocephalic.   Neck:      Thyroid: No thyromegaly.      Vascular: No JVD.      Lymphadenopathy: No cervical adenopathy.   Pulmonary:      Effort: Pulmonary effort is normal.      Breath sounds: Normal breath sounds. No wheezing. No rales.   Cardiovascular:      Normal rate. Regular rhythm.      No gallop.   Edema:     Peripheral edema absent.   Abdominal:      Palpations: Abdomen is soft.      Tenderness: There is no abdominal tenderness.   Musculoskeletal: Normal range of motion. Skin:     General: Skin is warm and dry.      Findings: No rash.   Neurological:      Mental Status: Alert and oriented to person, place, and time.           ECG 12 Lead    Date/Time: 1/14/2022 1:47 PM  Performed by: Alejandro Espino MD  Authorized by: Alejandro Espino MD   Comparison: compared with previous ECG   Similar to previous ECG  Rhythm: sinus rhythm    Clinical impression: normal ECG             Assessment:       Diagnosis Plan   1. STEMI involving right " coronary artery (HCC)     2. Hyperlipidemia, unspecified hyperlipidemia type     3. Essential hypertension     4. Tobacco abuse            Plan:       She seems to be doing well at this moment of time she is asymptomatic but she could be doing better she is not exercising at all she is overweight she has not been vaccinated I talked to her I told her I would like her to exercise for 40 minutes every day.  I love the fact that she is walking and going up and down stairs but we need a sustained effort.  I talked with her at length about diet and trying to change that.  I also charged talked to her at length and strongly recommended she get vaccinated I do not think she is going to.  I am going to have her come back and see Yarelis or Conchita in a year and see me in 2 years    As always, it has been a pleasure to participate in your patient's care.      Sincerely,       Alejandro Espino MD

## 2023-01-25 ENCOUNTER — OFFICE VISIT (OUTPATIENT)
Dept: CARDIOLOGY | Facility: CLINIC | Age: 48
End: 2023-01-25
Payer: COMMERCIAL

## 2023-01-25 VITALS
BODY MASS INDEX: 40.29 KG/M2 | HEIGHT: 64 IN | HEART RATE: 66 BPM | SYSTOLIC BLOOD PRESSURE: 100 MMHG | OXYGEN SATURATION: 96 % | WEIGHT: 236 LBS | DIASTOLIC BLOOD PRESSURE: 60 MMHG

## 2023-01-25 DIAGNOSIS — I10 ESSENTIAL HYPERTENSION: ICD-10-CM

## 2023-01-25 DIAGNOSIS — E78.5 HYPERLIPIDEMIA, UNSPECIFIED HYPERLIPIDEMIA TYPE: ICD-10-CM

## 2023-01-25 DIAGNOSIS — I25.10 CORONARY ARTERY DISEASE INVOLVING NATIVE CORONARY ARTERY OF NATIVE HEART WITHOUT ANGINA PECTORIS: Primary | ICD-10-CM

## 2023-01-25 PROCEDURE — 93000 ELECTROCARDIOGRAM COMPLETE: CPT | Performed by: NURSE PRACTITIONER

## 2023-01-25 PROCEDURE — 99214 OFFICE O/P EST MOD 30 MIN: CPT | Performed by: NURSE PRACTITIONER

## 2023-01-25 NOTE — PROGRESS NOTES
Date of Office Visit: 2023  Encounter Provider: HOLGER Lindsey  Place of Service: [facility] CARDIOLOGY  Patient Name: Nelda Mckeon  :1975    Chief Complaint   Patient presents with   • Coronary Artery Disease     1 YR F/U   :     HPI: Nelda Mckeon is a 47 y.o. female.  She is a patient of Dr. Espino's whom we follow for hypertension and coronary artery disease.  In , she suffered an inferior STEMI for which she underwent angioplasty and drug-eluting stenting to the proximal and mid RCA as well as the mid LAD.  She had normal LV function.   She was last seen in the office by Dr. Espino in 2022 at which time she was doing well.  Dr. Espino encouraged her to increase her exercise.  Otherwise, she was advised to follow-up in 1 year.   She has been doing well.  She denies any chest pain, shortness of breath, palpitations, edema, dizziness, syncope, bleeding difficulties or melena.  She occasionally has heartburn which causes a lot of anxiety for her.  Admittedly she has not been exercising.  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    Past Medical History:   Diagnosis Date   • Diabetes mellitus (HCC)    • Hypertension    • Myocardial infarction (HCC)        Past Surgical History:   Procedure Laterality Date   • CARDIAC CATHETERIZATION N/A 2019    Procedure: Left Heart Cath;  Surgeon: Alejandro Espino MD;  Location: [facility] INVASIVE LOCATION;  Service: Cardiovascular   • CARDIAC CATHETERIZATION N/A 2019    Procedure: Stent BILL coronary;  Surgeon: Alejandro Espino MD;  Location: [facility] INVASIVE LOCATION;  Service: Cardiovascular   • CARDIAC CATHETERIZATION N/A 2019    Procedure: Left ventriculography;  Surgeon: Alejandro Espino MD;  Location: [facility] INVASIVE LOCATION;  Service: Cardiovascular   • CARDIAC CATHETERIZATION N/A 2019    Procedure: Coronary angiography;  Surgeon:  "Alejandro Espino MD;  Location:  [facility] CATH INVASIVE LOCATION;  Service: Cardiovascular   • CHOLECYSTECTOMY     • [place] RT/LT HEART CATHETERS N/A 11/5/2019    Procedure: Percutaneous Coronary Intervention;  Surgeon: Alejandro Espino MD;  Location: [facility] CATH INVASIVE LOC[facility];  Service: Cardiovascular       Social History     Socioeconomic History   • Marital status: Single   Tobacco Use   • Smoking status: Former     Packs/day: 1.00     Years: 30.00     Pack years: 30.00     Types: Cigarettes   • Smokeless tobacco: Never   • Tobacco comments:     CAFFEINE USE: 4 COKE ZEROS DAILY   Vaping Use   • Vaping Use: Never used   Substance and Sexual Activity   • Alcohol use: Yes     Comment: \"very seldom, maybe one drink every 5-6 months\" per pt   • Drug use: No   • Sexual activity: Defer       History reviewed. No pertinent family history.    Review of Systems   Constitutional: Negative.   Cardiovascular: Negative.  Negative for chest pain, dyspnea on exertion, leg swelling, orthopnea, paroxysmal nocturnal dyspnea and syncope.   Respiratory: Negative.    Hematologic/Lymphatic: Negative for bleeding problem.   Musculoskeletal: Negative for falls.   Gastrointestinal: Positive for heartburn. Negative for melena.   Neurological: Negative for dizziness and light-headedness.       No Known Allergies      Current Outpatient Medications:   •  albuterol sulfate  (90 Base) MCG/ACT inhaler, Inhale 2 puffs Every 4 (Four) Hours As Needed for Wheezing., Disp: 8 g, Rfl: 0  •  atorvastatin (LIPITOR) 40 MG tablet, Take 1 tablet by mouth Every Night., Disp: 90 tablet, Rfl: 3  •  buPROPion (WELLBUTRIN) 75 MG tablet, Take 150 mg by mouth 2 (Two) Times a Day., Disp: , Rfl:   •  clopidogrel (PLAVIX) 75 MG tablet, Take 1 tablet by mouth Daily., Disp: 90 tablet, Rfl: 3  •  empagliflozin (JARDIANCE) 10 MG tablet tablet, Take  by mouth Daily., Disp: , Rfl:   •  lisinopril-hydrochlorothiazide (PRINZIDE,ZESTORETIC) 20-12.5 MG per tablet, Take 1 " "tablet by mouth 2 (two) times a day., Disp: , Rfl:   •  metFORMIN (GLUCOPHAGE) 500 MG tablet, Take 2 tablets by mouth 2 (Two) Times a Day With Meals., Disp: , Rfl:   •  pioglitazone (ACTOS) 45 MG tablet, Take 45 mg by mouth Daily., Disp: , Rfl:     Current Facility-Administered Medications:   •  albuterol sulfate HFA (PROVENTIL HFA;VENTOLIN HFA;PROAIR HFA) inhaler 2 puff, 2 puff, Inhalation, 4x Daily - RT, Jeronimo Prieto MD      Objective:     Vitals:    01/25/23 1502   BP: 100/60   BP Location: Right arm   Patient Position: Sitting   Pulse: 66   SpO2: 96%   Weight: 107 kg (236 lb)   Height: 162.6 cm (64\")     Body mass index is 40.51 kg/m².    PHYSICAL EXAM:    Neck:      Vascular: No JVD.   Pulmonary:      Effort: Pulmonary effort is normal.      Breath sounds: Normal breath sounds.   Cardiovascular:      Normal rate. Regular rhythm.      Murmurs: There is no murmur.      No gallop. No click. No rub.   Pulses:     Intact distal pulses.           ECG 12 Lead    Date/Time: 1/25/2023 3:20 PM  Performed by: Conchita Zarate APRN  Authorized by: Conchita Zarate APRN   Comparison: compared with previous ECG from 1/14/2022  Similar to previous ECG  Rhythm: sinus rhythm  Rate: normal  BPM: 66  Other findings: non-specific ST-T wave changes              Assessment:       Diagnosis Plan   1. Coronary artery disease involving native coronary artery of native heart without angina pectoris  ECG 12 Lead      2. Essential hypertension        3. Hyperlipidemia, unspecified hyperlipidemia type          Orders Placed This Encounter   Procedures   • ECG 12 Lead     This order was created via procedure documentation     Order Specific Question:   Release to patient     Answer:   Routine Release          Plan:       1.  Coronary artery disease.  History of inferior STEMI in 2019 at which time she underwent PCI of the proximal and mid RCA as well as mid LAD.  She denies any symptoms of angina.  Continue clopidogrel and " atorvastatin.      2.  Hypertension.  Her blood pressure is stable.  It is a little on the low side although this is reportedly not normal for her.      3.  Hyperlipidemia.  Lipid panel from November demonstrated an LDL of 72 and HDL of 52.  Continue atorvastatin 40 mg.      I think she is doing well.  I am not recommending any changes, and she will follow-up with Dr. Espino in 1 year.      As always, it has been a pleasure to participate in your patient's care.      Sincerely,         HOLGER Mitchell

## 2024-02-13 ENCOUNTER — OFFICE VISIT (OUTPATIENT)
Dept: CARDIOLOGY | Facility: CLINIC | Age: 49
End: 2024-02-13
Payer: COMMERCIAL

## 2024-02-13 VITALS
DIASTOLIC BLOOD PRESSURE: 84 MMHG | BODY MASS INDEX: 38.55 KG/M2 | SYSTOLIC BLOOD PRESSURE: 120 MMHG | HEIGHT: 63 IN | WEIGHT: 217.6 LBS | HEART RATE: 74 BPM

## 2024-02-13 DIAGNOSIS — I10 ESSENTIAL HYPERTENSION: ICD-10-CM

## 2024-02-13 DIAGNOSIS — I21.11 STEMI INVOLVING RIGHT CORONARY ARTERY: Primary | ICD-10-CM

## 2024-02-13 PROBLEM — R07.9 CHEST PAIN: Status: RESOLVED | Noted: 2021-08-03 | Resolved: 2024-02-13

## 2024-02-13 PROBLEM — Z91.148 NONCOMPLIANCE WITH MEDICATIONS: Status: RESOLVED | Noted: 2019-11-05 | Resolved: 2024-02-13

## 2024-02-13 PROCEDURE — 3074F SYST BP LT 130 MM HG: CPT | Performed by: INTERNAL MEDICINE

## 2024-02-13 PROCEDURE — 3079F DIAST BP 80-89 MM HG: CPT | Performed by: INTERNAL MEDICINE

## 2024-02-13 PROCEDURE — 93000 ELECTROCARDIOGRAM COMPLETE: CPT | Performed by: INTERNAL MEDICINE

## 2024-02-13 PROCEDURE — 99214 OFFICE O/P EST MOD 30 MIN: CPT | Performed by: INTERNAL MEDICINE

## 2025-02-18 ENCOUNTER — OFFICE VISIT (OUTPATIENT)
Dept: CARDIOLOGY | Facility: CLINIC | Age: 50
End: 2025-02-18
Payer: COMMERCIAL

## 2025-02-18 VITALS
SYSTOLIC BLOOD PRESSURE: 118 MMHG | BODY MASS INDEX: 37.39 KG/M2 | WEIGHT: 211 LBS | HEIGHT: 63 IN | DIASTOLIC BLOOD PRESSURE: 86 MMHG | HEART RATE: 65 BPM

## 2025-02-18 DIAGNOSIS — I21.11 STEMI INVOLVING RIGHT CORONARY ARTERY: ICD-10-CM

## 2025-02-18 DIAGNOSIS — E78.5 HYPERLIPIDEMIA, UNSPECIFIED HYPERLIPIDEMIA TYPE: ICD-10-CM

## 2025-02-18 DIAGNOSIS — I10 ESSENTIAL HYPERTENSION: ICD-10-CM

## 2025-02-18 DIAGNOSIS — I25.10 CORONARY ARTERY DISEASE INVOLVING NATIVE CORONARY ARTERY OF NATIVE HEART WITHOUT ANGINA PECTORIS: Primary | ICD-10-CM

## 2025-02-18 PROCEDURE — 3079F DIAST BP 80-89 MM HG: CPT | Performed by: INTERNAL MEDICINE

## 2025-02-18 PROCEDURE — 99214 OFFICE O/P EST MOD 30 MIN: CPT | Performed by: INTERNAL MEDICINE

## 2025-02-18 PROCEDURE — 93000 ELECTROCARDIOGRAM COMPLETE: CPT | Performed by: INTERNAL MEDICINE

## 2025-02-18 PROCEDURE — 3074F SYST BP LT 130 MM HG: CPT | Performed by: INTERNAL MEDICINE

## 2025-02-18 RX ORDER — LISINOPRIL 20 MG/1
20 TABLET ORAL DAILY
COMMUNITY
Start: 2005-02-13 | End: 2025-06-07

## 2025-02-18 RX ORDER — ONDANSETRON 4 MG/1
4 TABLET, FILM COATED ORAL EVERY 8 HOURS PRN
COMMUNITY
Start: 2024-09-16

## 2025-02-18 RX ORDER — FERROUS SULFATE 325(65) MG
1 TABLET ORAL
COMMUNITY

## 2025-02-18 RX ORDER — LANOLIN ALCOHOL/MO/W.PET/CERES
1 CREAM (GRAM) TOPICAL EVERY 12 HOURS SCHEDULED
COMMUNITY
Start: 2025-02-06

## 2025-02-18 RX ORDER — LORATADINE 10 MG/1
1 TABLET ORAL DAILY
COMMUNITY
Start: 2025-02-06

## 2025-02-18 NOTE — PROGRESS NOTES
Date of Office Visit: 25  Encounter Provider: Alejandro Espino MD  Place of Service: [facility] CARDIOLOGY  Patient Name: Nelda Mckeon  :1975  [id number]    CC: CAD  :     HPI: Nelda Mckeon is a 50 y.o. female  had a stuttering inferior STEMI in 2019 she was chopper to appear from [facility].  She had 2 drug-eluting stents implanted to her RCA 1 distal RCA one in the proximal RCA but she also had disease in her mid LAD that was stented. Her LV function was normal.  She had a little bit of disease in her circumflex and an OM1 but it was not really obstructive.      She comes in for follow-up and is doing well no chest pain shortness of breath PND orthopnea edema syncope palpitations she does have an occasional fleeting chest pain but nothing that sounds like angina she is not she is having a little bit of a bleeding difficulty right now she is having some vaginal bleeding and she is going to see her GYN about that otherwise is okay she is back on Mounjaro now she was on it and it got stopped now she is back on it her last HDL was 53 LDL was 63 and A1c was 6.3    Past Medical History:   Diagnosis Date    Diabetes mellitus     Hypertension     Myocardial infarction        Past Surgical History:   Procedure Laterality Date    CARDIAC CATHETERIZATION N/A 2019    Procedure: Left Heart Cath;  Surgeon: Alejandro Espino MD;  Location: [facility] CATH INVASIVE LOCATION;  Service: Cardiovascular    CARDIAC CATHETERIZATION N/A 2019    Procedure: Stent BILL coronary;  Surgeon: Alejandro Espino MD;  Location: [facility] CATH INVASIVE LOCATION;  Service: Cardiovascular    CARDIAC CATHETERIZATION N/A 2019    Procedure: Left ventriculography;  Surgeon: Alejandro Espino MD;  Location: [facility] CATH INVASIVE LOCATION;  Service: Cardiovascular    CARDIAC CATHETERIZATION N/A 2019    Procedure: Coronary angiography;  Surgeon: Alejandro Espino MD;   "Location:  [facility] CATH INVASIVE LOCATION;  Service: Cardiovascular    CHOLECYSTECTOMY      CT RT/LT HEART CATHETERS N/A 11/5/2019    Procedure: Percutaneous Coronary Intervention;  Surgeon: Alejandro Espino MD;  Location: [facility] CATH INVASIVE LOCATION;  Service: Cardiovascular       Social History     Socioeconomic History    Marital status: Single   Tobacco Use    Smoking status: Former     Current packs/day: 1.00     Average packs/day: 1 pack/day for 30.0 years (30.0 ttl pk-yrs)     Types: Cigarettes     Passive exposure: Past    Smokeless tobacco: Never    Tobacco comments:     CAFFEINE USE: 4 COKE ZEROS DAILY   Vaping Use    Vaping status: Never Used   Substance and Sexual Activity    Alcohol use: Not Currently     Comment: \"very seldom, maybe one drink every 5-6 months\" per pt    Drug use: No    Sexual activity: Defer       No family history on file.    Review of Systems   Constitutional: Negative for decreased appetite, fever, malaise/fatigue and weight loss.   HENT:  Negative for nosebleeds.    Eyes:  Negative for double vision.   Cardiovascular:  Negative for chest pain, claudication, cyanosis, dyspnea on exertion, irregular heartbeat, leg swelling, near-syncope, orthopnea, palpitations, paroxysmal nocturnal dyspnea and syncope.   Respiratory:  Negative for cough, hemoptysis and shortness of breath.    Hematologic/Lymphatic: Negative for bleeding problem.   Skin:  Negative for rash.   Musculoskeletal:  Negative for falls and myalgias.   Gastrointestinal:  Negative for hematochezia, jaundice, melena, nausea and vomiting.   Genitourinary:  Negative for hematuria.   Neurological:  Negative for dizziness and seizures.   Psychiatric/Behavioral:  Negative for altered mental status and memory loss.        No Known Allergies      Current Outpatient Medications:     atorvastatin (LIPITOR) 40 MG tablet, Take 1 tablet by mouth Every Night., Disp: 90 tablet, Rfl: 3    clopidogrel (PLAVIX) 75 MG tablet, Take 1 tablet by " "mouth Daily., Disp: 90 tablet, Rfl: 3    FeroSul 325 (65 Fe) MG tablet, Take 1 tablet by mouth Daily With Breakfast., Disp: , Rfl:     lisinopril (PRINIVIL,ZESTRIL) 20 MG tablet, Take 1 tablet by mouth Daily., Disp: , Rfl:     loratadine (CLARITIN) 10 MG tablet, Take 1 tablet by mouth Daily., Disp: , Rfl:     Magnesium Oxide -Mg Supplement 400 (240 Mg) MG tablet, Take 1 tablet by mouth Every 12 (Twelve) Hours., Disp: , Rfl:     ondansetron (ZOFRAN) 4 MG tablet, Take 1 tablet by mouth Every 8 (Eight) Hours As Needed for Nausea or Vomiting., Disp: , Rfl:     Tirzepatide (Mounjaro) 5 MG/0.5ML solution pen-injector pen, Inject 7.5 mg under the skin into the appropriate area as directed 1 (One) Time Per Week., Disp: , Rfl:     albuterol sulfate  (90 Base) MCG/ACT inhaler, Inhale 2 puffs Every 4 (Four) Hours As Needed for Wheezing. (Patient not taking: Reported on 2/13/2024), Disp: 8 g, Rfl: 0    buPROPion (WELLBUTRIN) 75 MG tablet, Take 2 tablets by mouth 2 (Two) Times a Day., Disp: , Rfl:     empagliflozin (JARDIANCE) 10 MG tablet tablet, Take  by mouth Daily., Disp: , Rfl:     lisinopril-hydrochlorothiazide (PRINZIDE,ZESTORETIC) 20-12.5 MG per tablet, Take 1 tablet by mouth 2 (two) times a day., Disp: , Rfl:     metFORMIN (GLUCOPHAGE) 500 MG tablet, Take 2 tablets by mouth 2 (Two) Times a Day With Meals., Disp: , Rfl:     pioglitazone (ACTOS) 45 MG tablet, Take 45 mg by mouth Daily. (Patient not taking: Reported on 2/13/2024), Disp: , Rfl:     Current Facility-Administered Medications:     albuterol sulfate HFA (PROVENTIL HFA;VENTOLIN HFA;PROAIR HFA) inhaler 2 puff, 2 puff, Inhalation, 4x Daily - RT, Jeronimo Prieto MD      Objective:     Vitals:    02/18/25 1344   BP: 118/86   Pulse: 65   Weight: 95.7 kg (211 lb)   Height: 160 cm (63\")     Body mass index is 37.38 kg/m².    Constitutional:       Appearance: Well-developed.   Eyes:      General: No scleral icterus.  HENT:      Head: Normocephalic.   Neck:      " "Thyroid: No thyromegaly.      Vascular: No JVD.      Lymphadenopathy: No cervical adenopathy.   Pulmonary:      Effort: Pulmonary effort is normal.      Breath sounds: Normal breath sounds. No wheezing. No rales.   Cardiovascular:      Normal rate. Regular rhythm.      No gallop.    Edema:     Peripheral edema absent.   Abdominal:      Palpations: Abdomen is soft.      Tenderness: There is no abdominal tenderness.   Musculoskeletal: Normal range of motion. Skin:     General: Skin is warm and dry.      Findings: No rash.   Neurological:      Mental Status: Alert and oriented to person, place, and time.           ECG 12 Lead    Date/Time: 2/18/2025 2:30 PM  Performed by: Alejandro Espino MD    Authorized by: Alejandro Espino MD  Comparison: compared with previous ECG   Similar to previous ECG  Rhythm: sinus rhythm    Clinical impression: normal ECG           Assessment:       Diagnosis Plan   1. Coronary artery disease involving native coronary artery of native heart without angina pectoris        2. Essential hypertension        3. Hyperlipidemia, unspecified hyperlipidemia type        4. STEMI involving right coronary artery                 Plan:       \"This is a lady that we stented 5 years ago she has done pretty well from that standpoint but she had a lot of coronary disease with got to be super aggressive with our risk modification I think we need to switch her over to rosuvastatin at 40 mg a day I want her LDL under 55 I had like her to get some special lipid particles checked at her next blood draw she lives down in [place] I am a big fan of the GLP-1 drug for her hopefully she loses the weight I will have her come back and see us in a year she is going to MyChart me when the labs are drawn so I can look at them    As always, it has been a pleasure to participate in your patient's care.      Sincerely,       Alejandro Espino MD                  "

## (undated) DEVICE — 6F .070 JR 4 100CM: Brand: CORDIS

## (undated) DEVICE — TR BAND RADIAL ARTERY COMPRESSION DEVICE: Brand: TR BAND

## (undated) DEVICE — GUIDE CATHETER: Brand: MACH1™

## (undated) DEVICE — PK CATH CARD 40

## (undated) DEVICE — NC TREK CORONARY DILATATION CATHETER 3.5 MM X 15 MM / RAPID-EXCHANGE: Brand: NC TREK

## (undated) DEVICE — CATH DIAG IMPULSE FL3.5 5F 100CM

## (undated) DEVICE — KT MANIFLD CARDIAC

## (undated) DEVICE — NC TREK CORONARY DILATATION CATHETER 2.5 MM X 15 MM / RAPID-EXCHANGE: Brand: NC TREK

## (undated) DEVICE — TREK CORONARY DILATATION CATHETER 3.50 MM X 15 MM / RAPID-EXCHANGE: Brand: TREK

## (undated) DEVICE — CATH VENT MIV RADL PIG ST TIP 4F 110CM

## (undated) DEVICE — GLIDESHEATH BASIC HYDROPHILIC COATED INTRODUCER SHEATH: Brand: GLIDESHEATH

## (undated) DEVICE — GW HITORQUE/BAL MID/WT J W/HCOAT .014 3X190CM

## (undated) DEVICE — GW EMR FIX EXCHG J STD .035 3MM 260CM